# Patient Record
Sex: MALE | Race: WHITE | NOT HISPANIC OR LATINO | Employment: FULL TIME | URBAN - METROPOLITAN AREA
[De-identification: names, ages, dates, MRNs, and addresses within clinical notes are randomized per-mention and may not be internally consistent; named-entity substitution may affect disease eponyms.]

---

## 2022-02-18 ENCOUNTER — OFFICE VISIT (OUTPATIENT)
Dept: FAMILY MEDICINE | Facility: CLINIC | Age: 37
End: 2022-02-18
Payer: COMMERCIAL

## 2022-02-18 VITALS
WEIGHT: 315 LBS | BODY MASS INDEX: 37.19 KG/M2 | RESPIRATION RATE: 16 BRPM | HEIGHT: 77 IN | HEART RATE: 80 BPM | TEMPERATURE: 97.5 F | DIASTOLIC BLOOD PRESSURE: 74 MMHG | SYSTOLIC BLOOD PRESSURE: 136 MMHG

## 2022-02-18 DIAGNOSIS — Z11.4 SCREENING FOR HIV (HUMAN IMMUNODEFICIENCY VIRUS): ICD-10-CM

## 2022-02-18 DIAGNOSIS — Z11.59 ENCOUNTER FOR HEPATITIS C SCREENING TEST FOR LOW RISK PATIENT: ICD-10-CM

## 2022-02-18 DIAGNOSIS — Z00.00 ENCOUNTER FOR ROUTINE ADULT HEALTH EXAMINATION WITHOUT ABNORMAL FINDINGS: Primary | ICD-10-CM

## 2022-02-18 DIAGNOSIS — Z13.1 SCREENING FOR DIABETES MELLITUS: ICD-10-CM

## 2022-02-18 DIAGNOSIS — E88.810 METABOLIC SYNDROME X: ICD-10-CM

## 2022-02-18 DIAGNOSIS — I10 ESSENTIAL HYPERTENSION: ICD-10-CM

## 2022-02-18 DIAGNOSIS — K42.9 UMBILICAL HERNIA WITHOUT OBSTRUCTION AND WITHOUT GANGRENE: ICD-10-CM

## 2022-02-18 DIAGNOSIS — Z13.220 SCREENING FOR LIPID DISORDERS: ICD-10-CM

## 2022-02-18 DIAGNOSIS — E66.812 CLASS 2 SEVERE OBESITY DUE TO EXCESS CALORIES WITH SERIOUS COMORBIDITY AND BODY MASS INDEX (BMI) OF 39.0 TO 39.9 IN ADULT (H): ICD-10-CM

## 2022-02-18 DIAGNOSIS — E66.01 CLASS 2 SEVERE OBESITY DUE TO EXCESS CALORIES WITH SERIOUS COMORBIDITY AND BODY MASS INDEX (BMI) OF 39.0 TO 39.9 IN ADULT (H): ICD-10-CM

## 2022-02-18 DIAGNOSIS — R06.83 SNORES: ICD-10-CM

## 2022-02-18 PROBLEM — K62.89 MASS OF PERIRECTAL SOFT TISSUE: Status: ACTIVE | Noted: 2018-10-29

## 2022-02-18 PROBLEM — K62.89 MASS OF PERIRECTAL SOFT TISSUE: Status: RESOLVED | Noted: 2018-10-29 | Resolved: 2022-02-18

## 2022-02-18 LAB
ALT SERPL W P-5'-P-CCNC: 22 U/L (ref 0–45)
ANION GAP SERPL CALCULATED.3IONS-SCNC: 9 MMOL/L (ref 5–18)
BUN SERPL-MCNC: 21 MG/DL (ref 8–22)
CALCIUM SERPL-MCNC: 9.3 MG/DL (ref 8.5–10.5)
CHLORIDE BLD-SCNC: 107 MMOL/L (ref 98–107)
CHOLEST SERPL-MCNC: 220 MG/DL
CO2 SERPL-SCNC: 21 MMOL/L (ref 22–31)
CREAT SERPL-MCNC: 0.79 MG/DL (ref 0.7–1.3)
FASTING STATUS PATIENT QL REPORTED: YES
GFR SERPL CREATININE-BSD FRML MDRD: >90 ML/MIN/1.73M2
GLUCOSE BLD-MCNC: 91 MG/DL (ref 70–125)
HBA1C MFR BLD: 5 %
HDLC SERPL-MCNC: 50 MG/DL
HIV 1+2 AB+HIV1 P24 AG SERPL QL IA: NEGATIVE
LDLC SERPL CALC-MCNC: 135 MG/DL
POTASSIUM BLD-SCNC: 4.5 MMOL/L (ref 3.5–5)
SODIUM SERPL-SCNC: 137 MMOL/L (ref 136–145)
TRIGL SERPL-MCNC: 176 MG/DL

## 2022-02-18 PROCEDURE — 36415 COLL VENOUS BLD VENIPUNCTURE: CPT | Performed by: FAMILY MEDICINE

## 2022-02-18 PROCEDURE — 86803 HEPATITIS C AB TEST: CPT | Performed by: FAMILY MEDICINE

## 2022-02-18 PROCEDURE — 87389 HIV-1 AG W/HIV-1&-2 AB AG IA: CPT | Performed by: FAMILY MEDICINE

## 2022-02-18 PROCEDURE — 99214 OFFICE O/P EST MOD 30 MIN: CPT | Mod: 25 | Performed by: FAMILY MEDICINE

## 2022-02-18 PROCEDURE — 99385 PREV VISIT NEW AGE 18-39: CPT | Performed by: FAMILY MEDICINE

## 2022-02-18 PROCEDURE — 80061 LIPID PANEL: CPT | Performed by: FAMILY MEDICINE

## 2022-02-18 PROCEDURE — 80048 BASIC METABOLIC PNL TOTAL CA: CPT | Performed by: FAMILY MEDICINE

## 2022-02-18 PROCEDURE — 84460 ALANINE AMINO (ALT) (SGPT): CPT | Performed by: FAMILY MEDICINE

## 2022-02-18 PROCEDURE — 83036 HEMOGLOBIN GLYCOSYLATED A1C: CPT | Performed by: FAMILY MEDICINE

## 2022-02-18 RX ORDER — HYDROCHLOROTHIAZIDE 25 MG/1
25 TABLET ORAL DAILY
Qty: 30 TABLET | Refills: 1 | Status: SHIPPED | OUTPATIENT
Start: 2022-02-18 | End: 2022-03-24

## 2022-02-18 RX ORDER — FAMOTIDINE 20 MG
2000 TABLET ORAL DAILY
COMMUNITY

## 2022-02-18 RX ORDER — BENAZEPRIL HYDROCHLORIDE 20 MG/1
20 TABLET ORAL DAILY
Qty: 30 TABLET | Refills: 1 | Status: SHIPPED | OUTPATIENT
Start: 2022-02-18 | End: 2022-03-24

## 2022-02-18 RX ORDER — AMLODIPINE AND BENAZEPRIL HYDROCHLORIDE 10; 20 MG/1; MG/1
1 CAPSULE ORAL DAILY
COMMUNITY
Start: 2021-03-04 | End: 2022-02-18

## 2022-02-18 RX ORDER — CHLORAL HYDRATE 500 MG
2 CAPSULE ORAL DAILY
COMMUNITY

## 2022-02-18 ASSESSMENT — ENCOUNTER SYMPTOMS
DYSURIA: 0
WEAKNESS: 0
HEMATOCHEZIA: 0
COUGH: 0
PALPITATIONS: 0
CHILLS: 0
DIARRHEA: 0
PARESTHESIAS: 0
HEMATURIA: 0
CONSTIPATION: 0
HEADACHES: 0
SORE THROAT: 0
NAUSEA: 0
NERVOUS/ANXIOUS: 0
SHORTNESS OF BREATH: 0
EYE PAIN: 0
HEARTBURN: 0
DIZZINESS: 0
ARTHRALGIAS: 0
MYALGIAS: 0
JOINT SWELLING: 0
FREQUENCY: 0
FEVER: 0
ABDOMINAL PAIN: 0

## 2022-02-18 NOTE — PATIENT INSTRUCTIONS
Dr. Lokesh Watts MD   - The Obesity Code   - YouTube    Dr. Daniele Lomax MD   - Always [place]    Raoul Álvarez:   - Why we get sick    Dr. Dorothea Zamudio, DO.   Search for her name in Youtube with added criteria: Calli Rose, PhD   - Why We Sleep   - search for author name and podcast for a number of interviews  _______________________________    Interested articles on nutrition:    Saturated fat: Saturated Fats and Health: A Reassessment and Proposal for Food-Based Recommendations: JACC State-of-the-Art Review June (https://www.jacc.org/doi/full/10.1016/j.jacc.2020.05.077)    A Pesco-Mediterranean Diet With Intermittent Fasting: JACC Review Topic of the Week (https://www.jacc.org/doi/full/10.1016/j.jacc.2020.07.049)

## 2022-02-18 NOTE — ASSESSMENT & PLAN NOTE
Annual exam.  Weight gain.  Excessive hungry nests.  He loosely meets criteria for metabolic syndrome.  Fasting lab work will be completed.  Discussed nutritional restriction of carbohydrates with a focus on protein.  He has been successful with losing weight through healthy eating increase physical activity.  Declines COVID-19 vaccination.

## 2022-02-18 NOTE — ASSESSMENT & PLAN NOTE
Lower extremity swelling as a side effect.  He has not had a work-up for secondary causes.  He is high risk for obstructive sleep apnea.  -Check basic metabolic panel.  -Stop amlodipine.  Start hydrochlorothiazide.  Continue benazepril.  -Return to clinic in 2 weeks for blood serum electrolyte measurement and BP check.  -Consider sleep medicine referral.

## 2022-02-18 NOTE — PROGRESS NOTES
SUBJECTIVE:   CC: Hammad Estrada is an 36 year old male who presents for preventative health visit.     Chief Complaint   Patient presents with     Physical     Hypertension     Patient has been advised of split billing requirements and indicates understanding: Yes     Swelling in lower extremities   - works as :   - worse when on feet all day.  Red and burns at the end of shift.  Overnight redness.     - snacks throughout the day   - hungry a lot   - overeats at night.   - some hangrinness    Healthy Habits:     Getting at least 3 servings of Calcium per day:  Yes    Bi-annual eye exam:  NO    Dental care twice a year:  Yes    Sleep apnea or symptoms of sleep apnea:  Excessive snoring and Sleep apnea    Diet:  Low salt and Gluten-free/reduced    Frequency of exercise:  4-5 days/week    Duration of exercise:  45-60 minutes    Taking medications regularly:  Yes    Medication side effects:  Other    PHQ-2 Total Score: 0    Additional concerns today:  No    Today's PHQ-2 Score:   PHQ-2 (  [employer]) 2022   Q1: Little interest or pleasure in doing things 0   Q2: Feeling down, depressed or hopeless 0   PHQ-2 Score 0   Q1: Little interest or pleasure in doing things Not at all   Q2: Feeling down, depressed or hopeless Not at all   PHQ-2 Score 0       Abuse: Current or Past(Physical, Sexual or Emotional)- No  Do you feel safe in your environment? Yes        Social History     Tobacco Use     Smoking status: Former Smoker     Packs/day: 1.00     Start date: 2004     Quit date: 2021     Years since quittin.1     Smokeless tobacco: Never Used     Tobacco comment: Vape pen tobacco free   Substance Use Topics     Alcohol use: Yes     Alcohol/week: 3.0 standard drinks     Types: 3 Glasses of wine per week     Comment: social.  Weekly.          Alcohol Use 2022   Prescreen: >3 drinks/day or >7 drinks/week? No       Last PSA: No results found for: PSA    Reviewed orders with patient. Reviewed health  "maintenance and updated orders accordingly - Yes    Reviewed and updated as needed this visit by clinical staff   Tobacco  Allergies  Meds  Problems  Med Hx   Fam Hx  Soc Hx        Reviewed and updated as needed this visit by Provider    Allergies   Problems  Med Hx    Soc Hx         Review of Systems   Constitutional: Negative for chills and fever.   HENT: Negative for congestion, ear pain, hearing loss and sore throat.    Eyes: Negative for pain and visual disturbance.   Respiratory: Negative for cough and shortness of breath.    Cardiovascular: Negative for chest pain, palpitations and peripheral edema.   Gastrointestinal: Negative for abdominal pain, constipation, diarrhea, heartburn, hematochezia and nausea.   Genitourinary: Negative for dysuria, frequency, genital sores, hematuria, impotence, penile discharge and urgency.   Musculoskeletal: Negative for arthralgias, joint swelling and myalgias.   Skin: Negative for rash.   Neurological: Negative for dizziness, weakness, headaches and paresthesias.   Psychiatric/Behavioral: Negative for mood changes. The patient is not nervous/anxious.        OBJECTIVE:   /74 (BP Location: Left arm, Patient Position: Sitting, Cuff Size: Adult Large)   Pulse 80   Temp 97.5  F (36.4  C) (Oral)   Resp 16   Ht 1.943 m (6' 4.5\")   Wt 148 kg (326 lb 3 oz)   BMI 39.19 kg/m      Physical Exam  Vitals reviewed.   Constitutional:       General: He is not in acute distress.     Appearance: Normal appearance.   HENT:      Head: Normocephalic and atraumatic.      Right Ear: External ear normal.      Left Ear: External ear normal.      Nose: Nose normal.      Mouth/Throat:      Pharynx: No oropharyngeal exudate or posterior oropharyngeal erythema.   Eyes:      General: No scleral icterus.  Cardiovascular:      Rate and Rhythm: Normal rate and regular rhythm.      Heart sounds: Normal heart sounds. No murmur heard.    No friction rub. No gallop.   Pulmonary:      Effort: " Pulmonary effort is normal. No respiratory distress.      Breath sounds: No wheezing.   Abdominal:      General: Bowel sounds are normal. There is no distension.      Palpations: Abdomen is soft. There is no mass.      Tenderness: There is no abdominal tenderness.   Musculoskeletal:         General: No swelling. Normal range of motion.      Cervical back: Normal range of motion.   Skin:     Findings: No rash.      Comments: Skin tags.   Neurological:      General: No focal deficit present.      Mental Status: He is alert and oriented to person, place, and time.      Cranial Nerves: No cranial nerve deficit.      Deep Tendon Reflexes: Reflexes normal.   Psychiatric:         Mood and Affect: Mood normal.         Behavior: Behavior normal.         Thought Content: Thought content normal.         Judgment: Judgment normal.       Diagnostic Test Results:  Labs reviewed in Epic    ASSESSMENT/PLAN:     Essential hypertension  Lower extremity swelling as a side effect.  He has not had a work-up for secondary causes.  He is high risk for obstructive sleep apnea.  -Check basic metabolic panel.  -Stop amlodipine.  Start hydrochlorothiazide.  Continue benazepril.  -Return to clinic in 2 weeks for blood serum electrolyte measurement and BP check.  -Consider sleep medicine referral.    Encounter for routine adult health examination without abnormal findings  Annual exam.  Weight gain.  Excessive hungry nests.  He loosely meets criteria for metabolic syndrome.  Fasting lab work will be completed.  Discussed nutritional restriction of carbohydrates with a focus on protein.  He has been successful with losing weight through healthy eating increase physical activity.  Declines COVID-19 vaccination.    Umbilical hernia without obstruction and without gangrene  Occasionally uncomfortable.  At this time, the patient does not want an intervention.    Patient has been advised of split billing requirements and indicates understanding:  "Yes    COUNSELING:   Reviewed preventive health counseling, as reflected in patient instructions       Regular exercise       Healthy diet/nutrition       Consider Hep C screening for all patients one time for ages 18-79 years       HIV screeninx in teen years, 1x in adult years, and at intervals if high risk       Colorectal cancer screening       Prostate cancer screening    Estimated body mass index is 39.19 kg/m  as calculated from the following:    Height as of this encounter: 1.943 m (6' 4.5\").    Weight as of this encounter: 148 kg (326 lb 3 oz).     Weight management plan: Discussed healthy diet and exercise guidelines    He reports that he quit smoking about 13 months ago. He started smoking about 18 years ago. He smoked 1.00 pack per day. He has never used smokeless tobacco.      Counseling Resources:  ATP IV Guidelines  Pooled Cohorts Equation Calculator  FRAX Risk Assessment  ICSI Preventive Guidelines  Dietary Guidelines for Americans,   USDA's MyPlate  ASA Prophylaxis  Lung CA Screening    Johnie Mobley MD  [facility]  "

## 2022-02-21 LAB — HCV AB SERPL QL IA: NEGATIVE

## 2022-03-04 ENCOUNTER — ALLIED HEALTH/NURSE VISIT (OUTPATIENT)
Dept: FAMILY MEDICINE | Facility: CLINIC | Age: 37
End: 2022-03-04
Payer: COMMERCIAL

## 2022-03-04 ENCOUNTER — LAB (OUTPATIENT)
Dept: LAB | Facility: CLINIC | Age: 37
End: 2022-03-04

## 2022-03-04 VITALS — DIASTOLIC BLOOD PRESSURE: 67 MMHG | HEART RATE: 68 BPM | SYSTOLIC BLOOD PRESSURE: 122 MMHG

## 2022-03-04 DIAGNOSIS — Z11.4 SCREENING FOR HIV (HUMAN IMMUNODEFICIENCY VIRUS): Primary | ICD-10-CM

## 2022-03-04 DIAGNOSIS — I10 ESSENTIAL HYPERTENSION: Primary | ICD-10-CM

## 2022-03-04 DIAGNOSIS — I10 ESSENTIAL HYPERTENSION: ICD-10-CM

## 2022-03-04 LAB
ANION GAP SERPL CALCULATED.3IONS-SCNC: 9 MMOL/L (ref 5–18)
BUN SERPL-MCNC: 19 MG/DL (ref 8–22)
CALCIUM SERPL-MCNC: 9.5 MG/DL (ref 8.5–10.5)
CHLORIDE BLD-SCNC: 103 MMOL/L (ref 98–107)
CO2 SERPL-SCNC: 26 MMOL/L (ref 22–31)
CREAT SERPL-MCNC: 0.87 MG/DL (ref 0.7–1.3)
GFR SERPL CREATININE-BSD FRML MDRD: >90 ML/MIN/1.73M2
GLUCOSE BLD-MCNC: 106 MG/DL (ref 70–125)
POTASSIUM BLD-SCNC: 4 MMOL/L (ref 3.5–5)
SODIUM SERPL-SCNC: 138 MMOL/L (ref 136–145)

## 2022-03-04 PROCEDURE — 36415 COLL VENOUS BLD VENIPUNCTURE: CPT

## 2022-03-04 PROCEDURE — 99207 PR NO CHARGE NURSE ONLY: CPT

## 2022-03-04 PROCEDURE — 80048 BASIC METABOLIC PNL TOTAL CA: CPT

## 2022-03-04 NOTE — PROGRESS NOTES
I met with Hammad Estrada at the request of Dr. Alves to recheck his blood pressure.  Blood pressure medications on the med list were reviewed with patient.    Patient has taken all medications as per usual regimen: No  Patient reports tolerating them without any issues or concerns: No    Vitals:    03/04/22 0833   BP: 122/67   BP Location: Left arm   Patient Position: Sitting   Cuff Size: Adult Large   Pulse: 68       Blood pressure was taken, previous encounter was reviewed, recorded blood pressure below 140/90.  Patient was discharged and the note will be sent to the provider for final review.

## 2022-03-15 DIAGNOSIS — I10 ESSENTIAL HYPERTENSION: ICD-10-CM

## 2022-03-15 RX ORDER — BENAZEPRIL HYDROCHLORIDE 20 MG/1
TABLET ORAL
Qty: 30 TABLET | Refills: 1 | OUTPATIENT
Start: 2022-03-15

## 2022-03-23 ENCOUNTER — MYC MEDICAL ADVICE (OUTPATIENT)
Dept: FAMILY MEDICINE | Facility: CLINIC | Age: 37
End: 2022-03-23
Payer: COMMERCIAL

## 2022-03-23 DIAGNOSIS — I10 ESSENTIAL HYPERTENSION: ICD-10-CM

## 2022-03-24 RX ORDER — BENAZEPRIL HYDROCHLORIDE 20 MG/1
20 TABLET ORAL DAILY
Qty: 90 TABLET | Refills: 1 | Status: SHIPPED | OUTPATIENT
Start: 2022-03-24 | End: 2022-10-12

## 2022-03-24 RX ORDER — HYDROCHLOROTHIAZIDE 25 MG/1
25 TABLET ORAL DAILY
Qty: 90 TABLET | Refills: 1 | Status: SHIPPED | OUTPATIENT
Start: 2022-03-24 | End: 2022-10-12

## 2022-04-03 ENCOUNTER — HEALTH MAINTENANCE LETTER (OUTPATIENT)
Age: 37
End: 2022-04-03

## 2022-04-14 DIAGNOSIS — I10 ESSENTIAL HYPERTENSION: ICD-10-CM

## 2022-04-17 RX ORDER — BENAZEPRIL HYDROCHLORIDE 20 MG/1
TABLET ORAL
Qty: 30 TABLET | Refills: 1 | OUTPATIENT
Start: 2022-04-17

## 2022-05-01 ENCOUNTER — MYC MEDICAL ADVICE (OUTPATIENT)
Dept: FAMILY MEDICINE | Facility: CLINIC | Age: 37
End: 2022-05-01
Payer: COMMERCIAL

## 2022-05-01 DIAGNOSIS — R06.83 SNORES: Primary | ICD-10-CM

## 2022-05-01 PROCEDURE — 99207 E-CONSULT TO SLEEP MEDICINE (ADULT OUTPT PROVIDER TO SPECIALIST WRITTEN QUESTION & RESPONSE): CPT | Performed by: FAMILY MEDICINE

## 2022-05-03 ENCOUNTER — E-CONSULT (OUTPATIENT)
Dept: SLEEP MEDICINE | Facility: CLINIC | Age: 37
End: 2022-05-03
Payer: COMMERCIAL

## 2022-05-03 DIAGNOSIS — R06.81 APNEA: ICD-10-CM

## 2022-05-03 DIAGNOSIS — I10 ESSENTIAL HYPERTENSION: Primary | ICD-10-CM

## 2022-05-03 DIAGNOSIS — R06.83 SNORING: ICD-10-CM

## 2022-05-03 PROCEDURE — 99451 NTRPROF PH1/NTRNET/EHR 5/>: CPT | Performed by: FAMILY MEDICINE

## 2022-05-03 NOTE — TELEPHONE ENCOUNTER
Stop bang score at least 6/8.  BMI 39.  Hypertension.  High neck circumference.  Snores with observed pauses.  E consult sent to sleep medicine for review.  Consider formal traditional referral if requested.

## 2022-05-03 NOTE — PROGRESS NOTES
ALL SMARTFIELDS MUST BE COMPLETED FOR PATIENT CARE AND BILLING    5/3/2022     E-Consult has been accepted.    Interprofessional consultation requested by:  Johnie Mobley MD      Clinical Question/Purpose: MY CLINICAL QUESTION IS: elevated STOP BANG (6-7/8).  BMI:39.  No previous PSG.     Patient assessment and information reviewed: Past medical history (HTN, obesity).  Snoring, observed apnea.    Recommendations:     High risk for VIRGILIO with STOPBANG score of 6-7, BMI ~39.  Appears to be candidate for home sleep testing or in-lab PSG.    Will place orders for WatchPAT home sleep testing to be sent via mail.      The recommendations provided in this E-Consult are based on a review of clinical data pertinent to the clinical question presented, without a review of the patient's complete medical record or, the benefit of a comprehensive in-person or virtual patient evaluation. This consultation should not replace the clinical judgement and evaluation of the provider ordering this E-Consult. Any new clinical issues, or changes in patient status since the filing of this E-Consult will need to be taken into account when assessing these recommendations. Please contact me if you have further questions.    My total time spent reviewing clinical information and formulating assessment was 5 minutes.    Report sent automatically to requesting provider once signed.     Kevin Mehta MD, MD

## 2022-06-06 ENCOUNTER — VIRTUAL VISIT (OUTPATIENT)
Dept: SLEEP MEDICINE | Facility: CLINIC | Age: 37
End: 2022-06-06
Attending: FAMILY MEDICINE
Payer: COMMERCIAL

## 2022-06-06 DIAGNOSIS — R06.83 SNORING: ICD-10-CM

## 2022-06-06 DIAGNOSIS — I10 ESSENTIAL HYPERTENSION: ICD-10-CM

## 2022-06-06 DIAGNOSIS — R06.81 APNEA: ICD-10-CM

## 2022-06-06 NOTE — PROGRESS NOTES
Device has been registered and shipped via [employer] on 6/6/2022. Patient was notified that package was mailed out.

## 2022-06-07 PROCEDURE — G0399 HOME SLEEP TEST/TYPE 3 PORTA: HCPCS | Performed by: INTERNAL MEDICINE

## 2022-06-08 ENCOUNTER — TELEPHONE (OUTPATIENT)
Dept: SLEEP MEDICINE | Facility: CLINIC | Age: 37
End: 2022-06-08
Payer: COMMERCIAL

## 2022-06-08 NOTE — TELEPHONE ENCOUNTER
Please call the patient to schedule for an earlier follow-up visit to review the results of the sleep study.  Please schedule patient to follow-up with me next week. May book on my lunch hour except for Wednesdays and Fridays.Thank you.

## 2022-06-08 NOTE — PROCEDURES
"WatchPAT - HOME SLEEP STUDY INTERPRETATION    Patient: Hammad Estrada  MRN: [id number]  YOB: 1985  Study Date: 06/07/22  Referring Provider: Jennifer Ref-Primary, Physician;   Ordering Provider: Prateek Rodriguez DO    Chain of custody patient verification was not enabled.  Chain of custody verification was not present throughout the entire study.     Indications for Home Study: Hammad Estrada is a 36 year old male who presents with symptoms suggestive of obstructive sleep apnea.    Estimated body mass index is 39.19 kg/m  as calculated from the following:    Height as of 2/18/22: 1.943 m (6' 4.5\").    Weight as of 2/18/22: 148 kg (326 lb 3 oz).      Data: A full night home sleep study was performed recording the standard physiologic parameters including peripheral arterial tonometry (PAT), sound/snoring, body position,  movement, sound, and oxygen saturation by pulse oximetry. Pulse rate was estimated by oximetry recording. Sleep staging (wake, REM, light, and deep sleep) was derived from PAT signal.  This study was considered adequate based on > 4 hours of quality oximetry and respiratory recording. As specified by the AASM Manual for the Scoring of Sleep and Associated events, version 2.3, Rule VIII.D 1B, 4% oxygen desaturation scoring for hypopneas is used as a standard of care on all home sleep apnea testing.    Total Recording Time: 6 hrs, 13 min  Total Sleep Time: 5 hrs, 31 min  % of Sleep Time REM: 32.5%    Respiratory:  Snoring: Snoring was present.  Respiratory events: The PAT respiratory disturbance index [pRDI] was 39.2 events per hour.  The PAT apnea/hypopnea index [pAHI] was 38.4 events per hour.  SAHIL was 34.6 events per hour.  During REM sleep the pAHI was 46.6.  Sleep Associated Hypoxemia: sustained hypoxemia was present. Mean oxygen saturation was 94%.  Minimum was 73%.  Time with saturation less than 88% was 9.4 minutes.    Heart Rate: By pulse oximetry normal rate was noted. "     Position: Percent of time spent: supine - 72.5%, prone - 21.7%, on right - 0.3%, on left - 5.4%.  pAHI was 45.9 per hour supine, 8.1 per hour prone, N/A per hour on right side, and 53.9 per hour on left side.     Assessment:   Severe obstructive sleep apnea.  Sleep associated hypoxemia was present.    Recommendations:  Consider auto-CPAP at 5-20 cmH2O or polysomnography with full night PAP titration.  Suggest optimizing sleep hygiene and avoiding sleep deprivation.  Weight management.    Diagnosis Code(s): Obstructive Sleep Apnea G47.33, Hypoxemia G47.36    Prateek Rodriguez DO, June 8, 2022   Diplomate, American Board of Internal Medicine, Sleep Medicine

## 2022-06-08 NOTE — PROGRESS NOTES
Watch Pat has been scored using rule 1B, 4%.  Patient to follow up with provider to determine appropriate therapy.    PAT AHI: 38.4    Ordering Provider: Kevin Mehta MD

## 2022-06-20 ENCOUNTER — VIRTUAL VISIT (OUTPATIENT)
Dept: SLEEP MEDICINE | Facility: CLINIC | Age: 37
End: 2022-06-20
Payer: COMMERCIAL

## 2022-06-20 VITALS — WEIGHT: 315 LBS | HEIGHT: 78 IN | BODY MASS INDEX: 36.45 KG/M2

## 2022-06-20 DIAGNOSIS — E66.9 OBESITY (BMI 30-39.9): ICD-10-CM

## 2022-06-20 DIAGNOSIS — G47.36 HYPOXEMIA ASSOCIATED WITH SLEEP: ICD-10-CM

## 2022-06-20 DIAGNOSIS — G47.33 OSA (OBSTRUCTIVE SLEEP APNEA): Primary | ICD-10-CM

## 2022-06-20 PROCEDURE — 99213 OFFICE O/P EST LOW 20 MIN: CPT | Mod: 95 | Performed by: NURSE PRACTITIONER

## 2022-06-20 ASSESSMENT — SLEEP AND FATIGUE QUESTIONNAIRES
HOW LIKELY ARE YOU TO NOD OFF OR FALL ASLEEP WHILE SITTING INACTIVE IN A PUBLIC PLACE: SLIGHT CHANCE OF DOZING
HOW LIKELY ARE YOU TO NOD OFF OR FALL ASLEEP WHILE SITTING QUIETLY AFTER LUNCH WITHOUT ALCOHOL: SLIGHT CHANCE OF DOZING
HOW LIKELY ARE YOU TO NOD OFF OR FALL ASLEEP WHILE SITTING AND TALKING TO SOMEONE: WOULD NEVER DOZE
HOW LIKELY ARE YOU TO NOD OFF OR FALL ASLEEP WHEN YOU ARE A PASSENGER IN A CAR FOR AN HOUR WITHOUT A BREAK: WOULD NEVER DOZE
HOW LIKELY ARE YOU TO NOD OFF OR FALL ASLEEP WHILE WATCHING TV: SLIGHT CHANCE OF DOZING
HOW LIKELY ARE YOU TO NOD OFF OR FALL ASLEEP WHILE SITTING AND READING: MODERATE CHANCE OF DOZING
HOW LIKELY ARE YOU TO NOD OFF OR FALL ASLEEP WHILE LYING DOWN TO REST IN THE AFTERNOON WHEN CIRCUMSTANCES PERMIT: HIGH CHANCE OF DOZING
HOW LIKELY ARE YOU TO NOD OFF OR FALL ASLEEP IN A CAR, WHILE STOPPED FOR A FEW MINUTES IN TRAFFIC: WOULD NEVER DOZE

## 2022-06-20 NOTE — PROGRESS NOTES
"Hammad is a 36 year old who is being evaluated via a billable video visit.      How would you like to obtain your AVS? MyChart  If the video visit is dropped, the invitation should be resent by: Send to e-mail at: [email or web address]  Will anyone else be joining your video visit? No  {If patient encounters technical issues they should call [phone] :[phone]}Stacie Bardales        Video-Visit Details    Video Start Time: {video visit start/end time for provider to select:152948}    Type of service:  Video Visit    Video End Time:{video visit start/end time for provider to select:152948}    Originating Location (pt. Location): {video visit patient location:665407::\"Home\"}    Distant Location (provider location):  [facility]     Platform used for Video Visit: {Virtual Visit Platforms:103428::\"Lemko\"}  "

## 2022-06-20 NOTE — PATIENT INSTRUCTIONS
"MY INFORMATION ON SLEEP APNEA-  Hammad Estrada    DOCTOR : TREY Jules CNP  SLEEP CENTER :      MY CONTACT NUMBER:   [facility]  [phone]  [facility]   [phone]  [facility]   [phone]      [facility]  [phone]  [facility]   [phone]    [facility] [place] [address], [address]  [place] [address]  Phone: [phone]    Hours:  Mon - Fri: 8:00 a.m. - 4:30 p.m.  Sat: Closed  Sun: Closed      Key Points:  1. What is Obstructive Sleep Apnea (VIRGILIO)? VIRGILIO is the most common type of sleep apnea. Apnea literally means, \"without breath.\" It is characterized by repetitive pauses in breathing, despite continued effort to breathe, and is usually associated with a reduction in blood oxygen saturation. Apneas can last 10 to over 60 seconds. It is caused by narrowing or collapse of the upper airway as muscles relax during sleep.   2. What are the consequences of VIRGILIO? Symptoms include: daytime sleepiness- possibly increasing the risk of falling asleep while driving, unrefreshing/restless sleep, snoring, insomnia, waking frequently to urinate, waking with heartburn or reflux, reduced concentration and memory, and morning headaches. Other health consequences may include development of high blood pressure. Untreated VIRGILIO also can contribute to heart disease, stroke and diabetes.   3. What are the treatment options? In most situations, sleep apnea is a lifelong disease that must be managed with daily therapy. Continuous Positive Airway (CPAP) is the most reliable treatment. A mouthguard to hold your jaw forward is usually the next most reliable option. Other options include postioning devices (to keep you off your back), nasal valves, tongue retaining device, weight loss, surgery. There is more detail about these options toward the end of this " document.  4. What are the most important things to remember about using CPAP?     WHERE CAN I FIND MORE INFORMATION?    American Academy of Sleep Medicine Patient information on sleep disorders:  http://yoursleep.aasmnet.org    CPAP -  WHY AND HOW?                 Continuous positive airway pressure, or CPAP, is the most effective treatment for obstructive sleep apnea. It works by blowing room air, through a mask, to hold your throat open. A decision to use CPAP is a major step forward in the pursuit of a healthier life. The successful use of CPAP will help you breathe easier, sleep better and live healthier. Using CPAP can be a positive experience if you keep these penn points in mind:  Commitment  CPAP is not a quick fix for your problem. It involves a long-term commitment to improve your sleep and your health.    Communication  Stay in close communication with both your sleep doctor and your CPAP supplier. Ask lots of questions and seek help when you need it.    Consistency  Use CPAP all night, every night and for every nap. You will receive the maximum health benefits from CPAP when you use it every time that you sleep. This will also make it easier for your body to adjust to the treatment.    Correction  The first machine and mask that you try may not be the best ones for you. Work with your sleep doctor and your CPAP supplier to make corrections to your equipment selection. Ask about trying a different type of machine or mask if you have ongoing problems. Make sure that your mask is a good fit and learn to use your equipment properly.    Challenge  Tell a family member or close friend to ask you each morning if you used your CPAP the previous night. Have someone to challenge you to give it your best effort.    Connection   Your adjustment to CPAP will be easier if you are able to connect with others who use the same treatment. Ask your sleep doctor if there is a support group in your area for people who have  "sleep apnea, or look for one on the Internet.  Comfort   Increase your level of comfort by using a saline spray, decongestant or heated humidifier if CPAP irritates your nose, mouth or throat. Use your unit's \"ramp\" setting to slowly get used to the air pressure level. There may be soft pads you can buy that will fit over your mask straps. Look on www.CPAP.com for accessories that can help make CPAP use more comfortable.  Cleaning   Clean your mask, tubing and headgear on a regular basis. Put this time in your schedule so that you don't forget to do it. Check and replace the filters for your CPAP unit and humidifier.    Completion   Although you are never finished with CPAP therapy, you should reward yourself by celebrating the completion of your first month of treatment. Expect this first month to be your hardest period of adjustment. It will involve some trial and error as you find the machine, mask and pressure settings that are right for you.    Continuation  After your first month of treatment, continue to make a daily commitment to use your CPAP all night, every night and for every nap.    CPAP-Tips to starting with success:  Begin using your CPAP for short periods of time during the day while you watch TV or read.    Use CPAP every night and for every nap. Using it less often reduces the health benefits and makes it harder for your body to get used to it.    Newer CPAP models are virtually silent; however, if you find the sound of your CPAP machine to be bothersome, place the unit under your bed to dampen the sound.     Make small adjustments to your mask, tubing, straps and headgear until you get the right fit. Tightening the mask may actually worsen the leak.  If it leaves significant marks on your face or irritates the bridge of your nose, it may not be the best mask for you.  Speak with the person who supplied the mask and consider trying other masks. Insurances will allow you to try different masks " during the first month of starting CPAP.  Insurance also covers a new mask, hose and filter about every 6 months.    Use a saline nasal spray to ease mild nasal congestion. Neti-Pot or saline nasal rinses may also help. Nasal gel sprays can help reduce nasal dryness.  Biotene mouthwash can be helpful to protect your teeth if you experience frequent dry mouth.  Dry mouth may be a sign of air escaping out of your mouth or out of the mask in the case of a full face mask.  Speak with your provider if you expect that is the case.     Take a nasal decongestant to relieve more severe nasal or sinus congestion.  Do not use Afrin (oxymetazoline) nasal spray more than 3 days in a row.  Speak with your sleep doctor if your nasal congestion is chronic.    Use a heated humidifier that fits your CPAP model to enhance your breathing comfort. Adjust the heat setting up if you get a dry nose or throat, down if you get condensation in the hose or mask.  Position the CPAP lower than you so that any condensation in the hose drains back into the machine rather than towards the mask.    Try a system that uses nasal pillows if traditional masks give you problems.    Clean your mask, tubing and headgear once a week. Make sure the equipment dries fully.    Regularly check and replace the filters for your CPAP unit and humidifier.    Work closely with your sleep provider and your CPAP supplier to make sure that you have the machine, mask and air pressure setting that works best for you. It is better to stop using it and call your provider to solve problems than to lay awake all night frustrated with the device.  Weight Loss:    Weight loss decreases severity of sleep apnea in most people with obesity. For those with mild obesity who have developed snoring with weight gain, even 15-30 pound weight loss can improve and occasionally eliminate sleep apnea.  Structured and life-long dietary and health habits are necessary to lose weight and keep  healthier weight levels.     Though there are significant health benefits from weight loss, long-term weight loss is very difficult to achieve- studies show success with dietary management in less than 10% of people. In addition, substantial weight loss may require years of dietary control and may be difficult if patients have severe obesity. In these cases, surgical management may be considered.    If you are interested in methods for weight loss, you should review the options discussed at the National Institutes of Health patient information sites:     http://win.niddk.nih.gov/publications/index.htm  http:/www.health.nih.gov/topic/WeightLossDieting    Bariatric programs offer counseling in all methods of weight loss:    Http:/www.[facility].org/Specialties/WeightLossSurgeryandMedicalMgmt/htm    Your BMI is Body mass index is 36.87 kg/m .    Weight management plan: Patient was referred to their PCP to discuss a diet and exercise plan.    Body mass index (BMI) is one way to tell whether you are at a healthy weight, overweight, or obese. It measures your weight in relation to your height.  A BMI of 18.5 to 24.9 is in the healthy range. A person with a BMI of 25 to 29.9 is considered overweight, and someone with a BMI of 30 or greater is considered obese.  Another way to find out if you are at risk for health problems caused by overweight and obesity is to measure your waist. If you are a woman and your waist is more than 35 inches, or if you are a man and your waist is more than 40 inches, your risk of disease may be higher.  More than two-thirds of American adults are considered overweight or obese. Being overweight or obese increases the risk for further weight gain.  Excess weight may lead to heart disease and diabetes. Creating and following plans for healthy eating and physical activity may help you improve your health.    Methods for maintaining or losing weight.    Weight control is part of healthy  lifestyle and includes exercise, emotional health, and healthy eating habits.  Careful eating habits lifelong is the mainstay of weight control.  Though there are significant health benefits from weight loss, long-term weight loss with diet alone may be very difficult to achieve- studies show long-term success with dietary management in less than 10% of people. Attaining a healthy weight may be especially difficult to achieve in those with severe obesity. In some cases, medications, devices and surgical management might be considered.    What can you do?    If you are overweight or obese and are interested in methods for weight loss, you should discuss this with your provider. In addition, we recommend that you review healthy life styles and methods for weight loss available through the National Institutes of Health patient information sites:     http://win.niddk.nih.gov/publications/index.htm

## 2022-06-20 NOTE — PROGRESS NOTES
"Hammad is a 36 year old who is being evaluated via a billable video visit.      How would you like to obtain your AVS? MyChart  If the video visit is dropped, the invitation should be resent by: Send to e-mail at: [email or web address]  Will anyone else be joining your video visit? Jennifer Bardales        Video-Visit Details    Video Start Time: 1:01 PM    Type of service:  Video Visit    Video End Time:  1:17 PM    Originating Location (pt. Location): Home    Distant Location (provider location):  [facility]     Platform used for Video Visit: aWhere      Home Sleep Apnea Testing Results Visit:    Chief Complaint   Patient presents with     sleep study follow up       Hammad Estrada is a 36 year old male who returns to [facility] after having had Home Sleep Apnea Testing.  He presented with symptoms suggestive of obstructive sleep apnea.  He underwent E-consult with Dr. Kevin Mehta on 5/3/2022 with symptoms of snoring and observed apneas in the setting of hypertension and obesity.  His STOP-BANG score was 6-7 and the patient subsequently underwent HST as noted below.  He presents today for follow-up of these results.    Estimated body mass index is 36.87 kg/m  as calculated from the following:    Height as of this encounter: 1.969 m (6' 5.5\").    Weight as of this encounter: 142.9 kg (315 lb).  Total score - [place]: 8 (6/20/2022 11:56 AM)   EVA Total Score: 11    [facility] Home Sleep Apnea Testing - 6/07/2022: 326 lbs 3 oz: pAHI 38.4/hr. Supine pAHI 45.9/hr.   pAHI was 45.9 per hour supine, 8.1 per hour prone, N/A per hour on right side, and 53.9 per hour on left side.   Oxygen Obed of 73%.  Baseline 94%.  Sp02 =< 88% for 9.4 minutes  He slept on his back (72.5%), prone (21.7%), left (5.4%) and right (0.3%) sides.   Total Recording Time: 6 hrs, 13 min  Total Sleep Time: 5 hrs, 31 min  % of Sleep Time REM: 32.5%      Hammad Estrada reports that he slept Fair . " "    Home Sleep Apnea Testing was reviewed in detail today with Hammad and a copy given to him for his records.    Past medical/surgical history, family history, social history, medications and allergies were reviewed.    Patient Active Problem List   Diagnosis     Essential hypertension     Encounter for routine adult health examination without abnormal findings     Umbilical hernia without obstruction and without gangrene     Metabolic syndrome X     Current Outpatient Medications   Medication     benazepril (LOTENSIN) 20 MG tablet     Collagen-Vitamin C-Biotin (COLLAGEN 1500/C PO)     fish oil-omega-3 fatty acids 1000 MG capsule     hydrochlorothiazide (HYDRODIURIL) 25 MG tablet     MULTIPLE VITAMIN PO     TURMERIC PO     Vitamin D, Cholecalciferol, 25 MCG (1000 UT) CAPS     No current facility-administered medications for this visit.     Ht 1.969 m (6' 5.5\")   Wt 142.9 kg (315 lb)   BMI 36.87 kg/m      Impression/Plan:  Severe Obstructive Sleep Apnea.   Sleep associated hypoxemia was present.  Obesity (BMI 30-39.9)  - Comprehensive DME    Treatment options discussed today including  auto-CPAP at 5-20 cmH2O or polysomnography with full night PAP titration.    Elected treatment with auto-CPAP at 5-20 cmH2O. A comprehensive DME order was placed for new APAP device, nasal pillow mask and supplies sent to [facility].  We discussed the usual use/compliance requirements associated with new PAP device therapy.  In addition, the patient was notified that there may be a delay in obtaining his new APAP device due to the ongoing nationwide CPAP supply shortage.  We also discussed the role the body weight plays with regard to obstructive sleep apnea and I have encouraged weight loss as the patient is able.    The patient was asked to follow-up in approximately 2 months after obtaining new APAP device to review download data and use/compliance.    25 minutes spent with patient with >50% spent in counseling, " chart review/documentation, and coordination of care on the date of the encounter.      TREY Jules CNP  Sleep Medicine      CC:  No Ref-Primary, Physician,     This note was written with the assistance of the Dragon voice-dictation technology software. The final document, although reviewed, may contain errors. For corrections, please contact the office.

## 2022-07-12 NOTE — NURSING NOTE
DME orders [facility]. My Chart message will be sent to patient:  Hammad Estrada  [address]   [address]  [place] [address]       2022      Dear Mr. Estrada,      Your provider has placed an order for you to get a new PAP device. Your medical equipment company will try to contact you as soon as possible to schedule your set up (Please be advised, due to a shortage of machines, this may take longer to receive call). Once you know the date of this appointment, please contact our office to schedule a follow up visit with your provider. This appointment should be scheduled 60 days from the day that you will be set up on your new device.     [facility] [employer] contact information:     [facility]: [phone]  [place]: [phone]  [place]: [phone]  [phone]  [place]: [phone]  [place]: [phone]        [facility]   Schedule line: [phone]      Sincerely,          Carole Bhatt, [facility]  Sleep Medicine

## 2022-08-09 ENCOUNTER — DOCUMENTATION ONLY (OUTPATIENT)
Dept: SLEEP MEDICINE | Facility: CLINIC | Age: 37
End: 2022-08-09

## 2022-08-09 DIAGNOSIS — G47.33 OSA (OBSTRUCTIVE SLEEP APNEA): Primary | ICD-10-CM

## 2022-09-20 NOTE — TELEPHONE ENCOUNTER
FUTURE VISIT INFORMATION      FUTURE VISIT INFORMATION:    Date: 10/25/22    Time: 11:20am    Location: [facility]  REFERRAL INFORMATION:    Referring provider:      Referring providers clinic:      Reason for visit/diagnosis  ear wax removal    RECORDS REQUESTED FROM:         Self referred- No records to collect per Pt    Clinic name Comments Records Status Imaging Status

## 2022-10-03 ENCOUNTER — HEALTH MAINTENANCE LETTER (OUTPATIENT)
Age: 37
End: 2022-10-03

## 2022-10-11 ASSESSMENT — ENCOUNTER SYMPTOMS
HEMATOCHEZIA: 0
PARESTHESIAS: 0
HEMATURIA: 0
DIZZINESS: 0
PALPITATIONS: 0
NAUSEA: 0
CHILLS: 0
HEADACHES: 0
WEAKNESS: 0
FEVER: 0
DYSURIA: 0
JOINT SWELLING: 0
COUGH: 0
NERVOUS/ANXIOUS: 0
FREQUENCY: 0
DIARRHEA: 0
HEARTBURN: 1
CONSTIPATION: 0
ABDOMINAL PAIN: 0
ARTHRALGIAS: 0
MYALGIAS: 0
SORE THROAT: 0
SHORTNESS OF BREATH: 0
EYE PAIN: 0

## 2022-10-12 ENCOUNTER — OFFICE VISIT (OUTPATIENT)
Dept: FAMILY MEDICINE | Facility: CLINIC | Age: 37
End: 2022-10-12
Payer: COMMERCIAL

## 2022-10-12 VITALS
HEART RATE: 91 BPM | BODY MASS INDEX: 37.19 KG/M2 | WEIGHT: 315 LBS | RESPIRATION RATE: 16 BRPM | HEIGHT: 77 IN | TEMPERATURE: 97.7 F | DIASTOLIC BLOOD PRESSURE: 88 MMHG | SYSTOLIC BLOOD PRESSURE: 138 MMHG | OXYGEN SATURATION: 98 %

## 2022-10-12 DIAGNOSIS — I10 ESSENTIAL HYPERTENSION: ICD-10-CM

## 2022-10-12 DIAGNOSIS — K64.4 EXTERNAL HEMORRHOIDS: ICD-10-CM

## 2022-10-12 DIAGNOSIS — Z82.49 FAMILY HISTORY OF CEREBRAL ANEURYSM: Primary | ICD-10-CM

## 2022-10-12 DIAGNOSIS — E66.01 MORBID OBESITY (H): ICD-10-CM

## 2022-10-12 PROCEDURE — 99214 OFFICE O/P EST MOD 30 MIN: CPT | Performed by: STUDENT IN AN ORGANIZED HEALTH CARE EDUCATION/TRAINING PROGRAM

## 2022-10-12 RX ORDER — POLYETHYLENE GLYCOL 3350 17 G/17G
1 POWDER, FOR SOLUTION ORAL DAILY
Qty: 850 G | Refills: 0 | Status: SHIPPED | OUTPATIENT
Start: 2022-10-12 | End: 2023-05-26

## 2022-10-12 RX ORDER — HYDROCHLOROTHIAZIDE 25 MG/1
25 TABLET ORAL DAILY
Qty: 90 TABLET | Refills: 1 | Status: SHIPPED | OUTPATIENT
Start: 2022-10-12 | End: 2023-04-03

## 2022-10-12 RX ORDER — HYDROCORTISONE 25 MG/G
CREAM TOPICAL 2 TIMES DAILY PRN
Qty: 30 G | Refills: 0 | Status: SHIPPED | OUTPATIENT
Start: 2022-10-12 | End: 2023-05-26

## 2022-10-12 RX ORDER — BENAZEPRIL HYDROCHLORIDE 20 MG/1
20 TABLET ORAL DAILY
Qty: 90 TABLET | Refills: 1 | Status: SHIPPED | OUTPATIENT
Start: 2022-10-12 | End: 2023-03-08

## 2022-10-12 ASSESSMENT — PAIN SCALES - GENERAL: PAINLEVEL: SEVERE PAIN (7)

## 2022-10-12 NOTE — PROGRESS NOTES
Assessment and Plan     36-year-old male with past with history of obesity, hypertension who presents with several concerns addressed as below today.    1. Family history of cerebral aneurysm  Mother recently  from cerebral aneurysm.  Patient would like to be screened.  Asymptomatic.  Discussed may not be covered by insurance but he would like to try which is reasonable.  - MRA Brain ([place]) w Contrast; Future    2. Morbid obesity (H)    3. Essential hypertension  Refilled he will follow-up with his PCP.  - hydrochlorothiazide (HYDRODIURIL) 25 MG tablet; Take 1 tablet (25 mg) by mouth daily  Dispense: 90 tablet; Refill: 1  - benazepril (LOTENSIN) 20 MG tablet; Take 1 tablet (20 mg) by mouth daily  Dispense: 90 tablet; Refill: 1    4. External hemorrhoids  Patient having intermittent bright red blood per rectum and can feel external hemorrhoids.  I recommended he trial conservative treatment with hydrocortisone now and MiraLAX daily to prevent them from occurring.  If these do recur I recommended he see a colorectal doctor and have a formal exam and consider further procedural treatment options.  - polyethylene glycol (MIRALAX) 17 GM/Dose powder; Take 17 g (1 capful) by mouth daily  Dispense: 850 g; Refill: 0  - hydrocortisone, Perianal, (HYDROCORTISONE) 2.5 % cream; Place rectally 2 times daily as needed for hemorrhoids  Dispense: 30 g; Refill: 0    Follow up: PRN  Options for treatment and follow-up care were reviewed with the patient and/or guardian. Hammad Estrada and/or guardian engaged in the decision making process and verbalized understanding of the options discussed and agreed with the final plan.    Dr. Adi Douglas         HPI:   Hammad Estrada is a 36 year old  male who presents for:    Chief Complaint   Patient presents with     Physical     Blood pressure meds refilled and hemroids. Ears flushed. Pt is fasting. Test to see about brain aneurysm  or any any aneurysm.      Hemorrhoids:     He tells me he has a flare about every 3 months with hemorrhoids.  He can feel external hemorrhoids.  They are painful and he will have blood on the stool in the toilet paper.  He has never had exam.  He is interested in basic treatment options as he has not done any.    BP med refiils  Patient is on 2 blood pressure medications needs refills on these today.  He has a primary care provider that he will follow-up regarding this.    Cerebral aneurysm:  Patient tells me his mother just passed away from a cerebral aneurysm that burst.  He would like to get screened for this.  He has not had any concerning symptoms like vision changes or headaches    Answers for HPI/ROS submitted by the patient on 10/11/2022  Frequency of exercise:: 2-3 days/week  Getting at least 3 servings of Calcium per day:: Yes  Diet:: Low salt, Low fat/cholesterol  Taking medications regularly:: Yes  Medication side effects:: None  Bi-annual eye exam:: NO  Dental care twice a year:: Yes  Sleep apnea or symptoms of sleep apnea:: Sleep apnea  abdominal pain: No  Blood in stool: No  Blood in urine: No  chest pain: No  chills: No  congestion: No  constipation: No  cough: No  diarrhea: No  dizziness: No  ear pain: No  eye pain: No  nervous/anxious: No  fever: No  frequency: No  genital sores: No  headaches: No  hearing loss: No  heartburn: Yes  arthralgias: No  joint swelling: No  peripheral edema: No  mood changes: No  myalgias: No  nausea: No  dysuria: No  palpitations: No  Skin sensation changes: No  sore throat: No  urgency: No  rash: No  shortness of breath: No  visual disturbance: No  weakness: No  impotence: No  penile discharge: No  Additional concerns today:: Yes  Duration of exercise:: 30-45 minutes         PMHX:     Patient Active Problem List   Diagnosis     Essential hypertension     Encounter for routine adult health examination without abnormal findings     Umbilical hernia without obstruction and without gangrene     Metabolic syndrome  X       Current Outpatient Medications   Medication Sig Dispense Refill     Collagen-Vitamin C-Biotin (COLLAGEN 1500/C PO) Take by mouth daily       fish oil-omega-3 fatty acids 1000 MG capsule Take 2 g by mouth daily       hydrochlorothiazide (HYDRODIURIL) 25 MG tablet Take 1 tablet (25 mg) by mouth daily 90 tablet 1     Minoxidil (ROGAINE MENS EX) hims Finasteride0.3%/Minoxidil6%.  Apply to areas of hair loss once daily. Do not exceed 4 sprays across the scalp.       MULTIPLE VITAMIN PO Take by mouth daily       TURMERIC PO Take by mouth daily       Vitamin D, Cholecalciferol, 25 MCG (1000 UT) CAPS Take 2,000 Units by mouth daily       benazepril (LOTENSIN) 20 MG tablet Take 1 tablet (20 mg) by mouth daily (Patient not taking: Reported on 10/12/2022) 90 tablet 1       Social History     Tobacco Use     Smoking status: Former     Packs/day: 1.00     Types: Cigarettes     Start date: 2004     Quit date: 2021     Years since quittin.7     Smokeless tobacco: Never     Tobacco comments:     Vape pen tobacco free   Vaping Use     Vaping Use: Every day     Substances: oil   Substance Use Topics     Alcohol use: Yes     Alcohol/week: 3.0 standard drinks     Types: 3 Glasses of wine per week     Comment: social.  Weekly.      Drug use: Yes     Types: Marijuana     Comment: very rare       Social History     Social History Narrative     Not on file       Allergies   Allergen Reactions     Penicillins Other (See Comments) and Unknown     Not sure- childhood allergy         No results found for this or any previous visit (from the past 24 hour(s)).         Review of Systems:    ROS: 10 point ROS neg other than the symptoms noted above in the HPI.         Physical Exam:     Vitals:    10/12/22 1123   BP: 138/88   BP Location: Left arm   Patient Position: Sitting   Cuff Size: Adult Large   Pulse: 91   Resp: 16   Temp: 97.7  F (36.5  C)   TempSrc: Temporal   SpO2: 98%   Weight: (!) 151.4 kg (333 lb 11.2 oz)   Height:  "1.943 m (6' 4.5\")     Body mass index is 40.09 kg/m .    General appearance: Alert, cooperative, no distress, appears stated age  Head: Normocephalic, atraumatic, without obvious abnormality  Eyes: Pupils equal round, reactive.  Conjunctiva clear.  Nose: Nares normal, no drainage.  Throat: Lips, mucosa, tongue normal mucosa pink and moist  Neck: Supple, symmetric, trachea midline,          "

## 2022-10-17 ENCOUNTER — OFFICE VISIT (OUTPATIENT)
Dept: FAMILY MEDICINE | Facility: CLINIC | Age: 37
End: 2022-10-17
Payer: COMMERCIAL

## 2022-10-17 VITALS
SYSTOLIC BLOOD PRESSURE: 112 MMHG | RESPIRATION RATE: 12 BRPM | TEMPERATURE: 99.2 F | BODY MASS INDEX: 37.19 KG/M2 | DIASTOLIC BLOOD PRESSURE: 76 MMHG | WEIGHT: 315 LBS | HEIGHT: 77 IN | HEART RATE: 80 BPM

## 2022-10-17 DIAGNOSIS — L05.91 PILONIDAL CYST: ICD-10-CM

## 2022-10-17 PROCEDURE — 99213 OFFICE O/P EST LOW 20 MIN: CPT | Performed by: FAMILY MEDICINE

## 2022-10-17 RX ORDER — METRONIDAZOLE 500 MG/1
500 TABLET ORAL 2 TIMES DAILY
Qty: 14 TABLET | Refills: 0 | Status: SHIPPED | OUTPATIENT
Start: 2022-10-17 | End: 2022-10-24

## 2022-10-17 RX ORDER — TRAMADOL HYDROCHLORIDE 50 MG/1
50-100 TABLET ORAL EVERY 6 HOURS PRN
Qty: 12 TABLET | Refills: 0 | Status: SHIPPED | OUTPATIENT
Start: 2022-10-17 | End: 2022-10-20

## 2022-10-17 NOTE — PROGRESS NOTES
"      Problem List Items Addressed This Visit        Medium    Pilonidal cyst     Cyst versus cellulitis, and does extend towards and up to pectinate line.  Will send to general surgery for further evaluation.  Did respond well to Flagyl previously, with no response to Bactrim previously and an allergy to penicillins.  Thus will put on treatment with Flagyl.  Side effects precautions discussed.         Relevant Medications    metroNIDAZOLE (FLAGYL) 500 MG tablet    traMADol (ULTRAM) 50 MG tablet    Other Relevant Orders    Adult General Surg Referral          Subjective   Hammad is a 36 year old who presents for the following health issues   Chief Complaint   Patient presents with     Derm Problem     \"Boils\" on Buttocks x10 days (pt states prior Hx of this issue approx. 3 years ago)      Patient developing pain in the buttock region in the gluteal fold.  Patient has had this previously in 2019.  But that one was more anterior in the perineum.  Did not respond to Septra and he was subsequently changed over to Flagyl and it responded.  Then also there was an incision made.  He said it took approximately 2 to 3 months before it stopped bleeding.  Base had no recurrence there.  This was started about a week ago.  Now painful to sit on.  Painful for bowel movement.  Has been taking Dulcolax to help keep the stool soft and that has been working.  No blood in his stools.  No fevers or chills.    History of Present Illness       Reason for visit:  Boil/abscess on my butt  Symptom onset:  1-2 weeks ago  Symptoms include:  Boils on buttocks  Symptom intensity:  Moderate  Symptom progression:  Worsening  Had these symptoms before:  Yes  Has tried/received treatment for these symptoms:  Yes  Previous treatment was successful:  Yes  Prior treatment description:  Excission/Medication  What makes it worse:  N/A  What makes it better:  N/A    He eats 2-3 servings of fruits and vegetables daily.He consumes 0 sweetened beverage(s) " "daily.He exercises with enough effort to increase his heart rate 30 to 60 minutes per day.  He exercises with enough effort to increase his heart rate 4 days per week.   He is taking medications regularly.       Review of Systems   All other systems reviewed and are negative.        Objective    /76 (BP Location: Left arm, Patient Position: Sitting, Cuff Size: Adult Large)   Pulse 80   Temp 99.2  F (37.3  C) (Oral)   Resp 12   Ht 1.943 m (6' 4.5\")   Wt (!) 152.2 kg (335 lb 9.6 oz)   BMI 40.32 kg/m    Body mass index is 40.32 kg/m .  Physical Exam  Vitals and nursing note reviewed.   Constitutional:       General: He is not in acute distress.     Appearance: Normal appearance. He is not ill-appearing.   HENT:      Head: Normocephalic and atraumatic.   Eyes:      Extraocular Movements: Extraocular movements intact.      Conjunctiva/sclera: Conjunctivae normal.   Pulmonary:      Effort: Pulmonary effort is normal.   Skin:     Capillary Refill: Capillary refill takes less than 2 seconds.      Comments: 3 cm area of induration and erythema of the right medial gluteal fold that does extend to the anus.  No drainage or identifiable nodule.  Tender to palpation.  No drainage.   Neurological:      Mental Status: He is alert and oriented to person, place, and time.   Psychiatric:         Attention and Perception: Attention normal.         Mood and Affect: Mood normal.         Speech: Speech normal.         Thought Content: Thought content normal.              This note has been dictated using voice recognition software. Any grammatical or context distortions are unintentional and inherent to the software      "

## 2022-10-17 NOTE — ASSESSMENT & PLAN NOTE
Cyst versus cellulitis, and does extend towards and up to pectinate line.  Will send to general surgery for further evaluation.  Did respond well to Flagyl previously, with no response to Bactrim previously and an allergy to penicillins.  Thus will put on treatment with Flagyl.  Side effects precautions discussed.

## 2022-10-25 ENCOUNTER — PRE VISIT (OUTPATIENT)
Dept: OTOLARYNGOLOGY | Facility: CLINIC | Age: 37
End: 2022-10-25

## 2023-01-28 DIAGNOSIS — I10 ESSENTIAL HYPERTENSION: ICD-10-CM

## 2023-01-29 RX ORDER — BENAZEPRIL HYDROCHLORIDE 20 MG/1
TABLET ORAL
Qty: 30 TABLET | Refills: 2 | OUTPATIENT
Start: 2023-01-29

## 2023-01-30 NOTE — TELEPHONE ENCOUNTER
Filled 10/12/22 90/1   [FreeTextEntry1] : Rosmery is a 6 yo M who presents with Mother for initial evaluation in our office regarding right supracondylar humerus fracture, sustained 10/19/22. Patient was playing with his brother when he fell onto his right elbow. He had pain and swelling about right elbow, exacerbated by movement. This occurred when family was in [place], and they flew home, and were seen in the ED the next day. XRs showed DUC fracture. He was placed into a long arm cast. Since injury, pain has improved. No tylenol/motrin needed. No numbness/tingling. No recent illnesses/fevers.\par He has been tolerating cast well.

## 2023-03-06 DIAGNOSIS — I10 ESSENTIAL HYPERTENSION: ICD-10-CM

## 2023-03-08 RX ORDER — BENAZEPRIL HYDROCHLORIDE 20 MG/1
TABLET ORAL
Qty: 30 TABLET | Refills: 2 | Status: SHIPPED | OUTPATIENT
Start: 2023-03-08 | End: 2023-05-26

## 2023-03-08 NOTE — TELEPHONE ENCOUNTER
"Routing refill request to provider for review/approval because:  Labs not current:  CR K    Last Written Prescription Date:  10/12/2022  Last Fill Quantity: 90,  # refills: 1   Last office visit provider:  10/17/2022     Requested Prescriptions   Pending Prescriptions Disp Refills     benazepril (LOTENSIN) 20 MG tablet [Pharmacy Med Name: BENAZEPRIL HCL 20 MG TABLET] 30 tablet 2     Sig: TAKE 1 TABLET BY MOUTH EVERY DAY       ACE Inhibitors (Including Combos) Protocol Failed - 3/8/2023 10:56 AM        Failed - Normal serum creatinine on file in past 12 months     Recent Labs   Lab Test 03/04/22  0831   CR 0.87       Ok to refill medication if creatinine is low          Failed - Normal serum potassium on file in past 12 months     Recent Labs   Lab Test 03/04/22  0831   POTASSIUM 4.0             Passed - Blood pressure under 140/90 in past 12 months     BP Readings from Last 3 Encounters:   10/17/22 112/76   10/12/22 138/88   03/04/22 122/67                 Passed - Recent (12 mo) or future (30 days) visit within the authorizing provider's specialty     Patient has had an office visit with the authorizing provider or a provider within the authorizing providers department within the previous 12 mos or has a future within next 30 days. See \"Patient Info\" tab in inbasket, or \"Choose Columns\" in Meds & Orders section of the refill encounter.              Passed - Medication is active on med list        Passed - Patient is age 18 or older             Viola Diaz RN 03/08/23 10:57 AM  "

## 2023-05-08 DIAGNOSIS — I10 ESSENTIAL HYPERTENSION: ICD-10-CM

## 2023-05-10 RX ORDER — HYDROCHLOROTHIAZIDE 25 MG/1
TABLET ORAL
Qty: 30 TABLET | Refills: 0 | Status: SHIPPED | OUTPATIENT
Start: 2023-05-10 | End: 2023-05-29

## 2023-05-10 NOTE — TELEPHONE ENCOUNTER
"Routing refill request to provider for review/approval because:  Reema given x1 and patient did not follow up, please advise  Labs not current:  multiple    Last Written Prescription Date:  4/3/2023  Last Fill Quantity: 30,  # refills: 0   Last office visit provider:  10/17/2022     Requested Prescriptions   Pending Prescriptions Disp Refills     hydrochlorothiazide (HYDRODIURIL) 25 MG tablet [Pharmacy Med Name: HYDROCHLOROTHIAZIDE 25 MG TAB] 30 tablet 0     Sig: TAKE 1 TABLET BY MOUTH EVERY DAY       Diuretics (Including Combos) Protocol Failed - 5/8/2023  1:32 PM        Failed - Normal serum creatinine on file in past 12 months     Recent Labs   Lab Test 03/04/22  0831   CR 0.87              Failed - Normal serum potassium on file in past 12 months     Recent Labs   Lab Test 03/04/22  0831   POTASSIUM 4.0                    Failed - Normal serum sodium on file in past 12 months     Recent Labs   Lab Test 03/04/22  0831                 Passed - Blood pressure under 140/90 in past 12 months     BP Readings from Last 3 Encounters:   10/17/22 112/76   10/12/22 138/88   03/04/22 122/67                 Passed - Recent (12 mo) or future (30 days) visit within the authorizing provider's specialty     Patient has had an office visit with the authorizing provider or a provider within the authorizing providers department within the previous 12 mos or has a future within next 30 days. See \"Patient Info\" tab in inbasket, or \"Choose Columns\" in Meds & Orders section of the refill encounter.              Passed - Medication is active on med list        Passed - Patient is age 18 or older             Phyllis Norris RN 05/09/23 9:23 PM      "

## 2023-05-21 ENCOUNTER — HEALTH MAINTENANCE LETTER (OUTPATIENT)
Age: 38
End: 2023-05-21

## 2023-05-26 ENCOUNTER — OFFICE VISIT (OUTPATIENT)
Dept: FAMILY MEDICINE | Facility: CLINIC | Age: 38
End: 2023-05-26
Payer: COMMERCIAL

## 2023-05-26 VITALS
HEART RATE: 83 BPM | WEIGHT: 315 LBS | OXYGEN SATURATION: 98 % | RESPIRATION RATE: 19 BRPM | SYSTOLIC BLOOD PRESSURE: 148 MMHG | TEMPERATURE: 98.4 F | DIASTOLIC BLOOD PRESSURE: 88 MMHG | BODY MASS INDEX: 37.19 KG/M2 | HEIGHT: 77 IN

## 2023-05-26 DIAGNOSIS — Z82.49 FAMILY HISTORY OF CEREBRAL ANEURYSM: ICD-10-CM

## 2023-05-26 DIAGNOSIS — Z00.00 ROUTINE GENERAL MEDICAL EXAMINATION AT A HEALTH CARE FACILITY: ICD-10-CM

## 2023-05-26 DIAGNOSIS — E66.01 MORBID OBESITY WITH BMI OF 40.0-44.9, ADULT (H): ICD-10-CM

## 2023-05-26 DIAGNOSIS — R53.83 OTHER FATIGUE: ICD-10-CM

## 2023-05-26 DIAGNOSIS — E87.1 HYPONATREMIA: ICD-10-CM

## 2023-05-26 DIAGNOSIS — K42.9 UMBILICAL HERNIA WITHOUT OBSTRUCTION AND WITHOUT GANGRENE: ICD-10-CM

## 2023-05-26 DIAGNOSIS — G47.33 OSA (OBSTRUCTIVE SLEEP APNEA): ICD-10-CM

## 2023-05-26 DIAGNOSIS — I10 ESSENTIAL HYPERTENSION: ICD-10-CM

## 2023-05-26 PROBLEM — L05.91 PILONIDAL CYST: Status: RESOLVED | Noted: 2022-10-17 | Resolved: 2023-05-26

## 2023-05-26 PROBLEM — K61.1 PERIRECTAL ABSCESS: Status: ACTIVE | Noted: 2022-10-18

## 2023-05-26 LAB
ALBUMIN UR-MCNC: NEGATIVE MG/DL
ANION GAP SERPL CALCULATED.3IONS-SCNC: 12 MMOL/L (ref 7–15)
APPEARANCE UR: CLEAR
BILIRUB UR QL STRIP: NEGATIVE
BUN SERPL-MCNC: 12.5 MG/DL (ref 6–20)
CALCIUM SERPL-MCNC: 9.4 MG/DL (ref 8.6–10)
CHLORIDE SERPL-SCNC: 103 MMOL/L (ref 98–107)
CHOLEST SERPL-MCNC: 216 MG/DL
COLOR UR AUTO: YELLOW
CREAT SERPL-MCNC: 0.83 MG/DL (ref 0.67–1.17)
DEPRECATED HCO3 PLAS-SCNC: 21 MMOL/L (ref 22–29)
GFR SERPL CREATININE-BSD FRML MDRD: >90 ML/MIN/1.73M2
GLUCOSE SERPL-MCNC: 103 MG/DL (ref 70–99)
GLUCOSE UR STRIP-MCNC: NEGATIVE MG/DL
HBA1C MFR BLD: 5.2 % (ref 0–5.6)
HDLC SERPL-MCNC: 45 MG/DL
HGB UR QL STRIP: NEGATIVE
KETONES UR STRIP-MCNC: NEGATIVE MG/DL
LDLC SERPL CALC-MCNC: 131 MG/DL
LEUKOCYTE ESTERASE UR QL STRIP: NEGATIVE
NITRATE UR QL: NEGATIVE
NONHDLC SERPL-MCNC: 171 MG/DL
PH UR STRIP: 6.5 [PH] (ref 5–7)
POTASSIUM SERPL-SCNC: 4.3 MMOL/L (ref 3.4–5.3)
SODIUM SERPL-SCNC: 136 MMOL/L (ref 136–145)
SP GR UR STRIP: 1.01 (ref 1–1.03)
TRIGL SERPL-MCNC: 202 MG/DL
TSH SERPL DL<=0.005 MIU/L-ACNC: 0.7 UIU/ML (ref 0.3–4.2)
UROBILINOGEN UR STRIP-ACNC: 0.2 E.U./DL

## 2023-05-26 PROCEDURE — 87491 CHLMYD TRACH DNA AMP PROBE: CPT | Performed by: INTERNAL MEDICINE

## 2023-05-26 PROCEDURE — 99215 OFFICE O/P EST HI 40 MIN: CPT | Mod: 25 | Performed by: INTERNAL MEDICINE

## 2023-05-26 PROCEDURE — 86780 TREPONEMA PALLIDUM: CPT | Performed by: INTERNAL MEDICINE

## 2023-05-26 PROCEDURE — 83036 HEMOGLOBIN GLYCOSYLATED A1C: CPT | Performed by: INTERNAL MEDICINE

## 2023-05-26 PROCEDURE — 87591 N.GONORRHOEAE DNA AMP PROB: CPT | Performed by: INTERNAL MEDICINE

## 2023-05-26 PROCEDURE — 99395 PREV VISIT EST AGE 18-39: CPT | Performed by: INTERNAL MEDICINE

## 2023-05-26 PROCEDURE — 87389 HIV-1 AG W/HIV-1&-2 AB AG IA: CPT | Performed by: INTERNAL MEDICINE

## 2023-05-26 PROCEDURE — 80048 BASIC METABOLIC PNL TOTAL CA: CPT | Performed by: INTERNAL MEDICINE

## 2023-05-26 PROCEDURE — 84443 ASSAY THYROID STIM HORMONE: CPT | Performed by: INTERNAL MEDICINE

## 2023-05-26 PROCEDURE — 36415 COLL VENOUS BLD VENIPUNCTURE: CPT | Performed by: INTERNAL MEDICINE

## 2023-05-26 PROCEDURE — 80061 LIPID PANEL: CPT | Performed by: INTERNAL MEDICINE

## 2023-05-26 PROCEDURE — 81003 URINALYSIS AUTO W/O SCOPE: CPT | Performed by: INTERNAL MEDICINE

## 2023-05-26 RX ORDER — LOSARTAN POTASSIUM 50 MG/1
50 TABLET ORAL DAILY
Qty: 30 TABLET | Refills: 1 | Status: SHIPPED | OUTPATIENT
Start: 2023-05-26 | End: 2023-07-24

## 2023-05-26 ASSESSMENT — ENCOUNTER SYMPTOMS
PALPITATIONS: 0
HEADACHES: 0
NERVOUS/ANXIOUS: 0
PARESTHESIAS: 0
HEMATURIA: 0
JOINT SWELLING: 0
CONSTIPATION: 0
SHORTNESS OF BREATH: 0
DYSURIA: 0
HEARTBURN: 1
MYALGIAS: 0
FEVER: 0
DIARRHEA: 0
SORE THROAT: 0
COUGH: 0
CHILLS: 0
FREQUENCY: 0
HEMATOCHEZIA: 0
EYE PAIN: 0
WEAKNESS: 0
ABDOMINAL PAIN: 0
ARTHRALGIAS: 0
DIZZINESS: 0
NAUSEA: 0

## 2023-05-26 ASSESSMENT — PAIN SCALES - GENERAL: PAINLEVEL: NO PAIN (0)

## 2023-05-26 NOTE — Clinical Note
Jerry Best- odd question, but this patient was wondering if there is a way to establish with a therapist who is montes?  He doesn't have gender concerns per se but feels like a shared perspective would be therapeutic? Do you have a recommendation?-Lindsay

## 2023-05-26 NOTE — PROGRESS NOTES
SUBJECTIVE:   CC: Hammad is an 37 year old who presents for preventative health visit.       5/26/2023    10:57 AM   Additional Questions   Roomed by Merced BARRIOS   Patient has been advised of split billing requirements and indicates understanding: Yes  Healthy Habits:     Getting at least 3 servings of Calcium per day:  Yes    Bi-annual eye exam:  Yes    Dental care twice a year:  Yes    Sleep apnea or symptoms of sleep apnea:  Sleep apnea    Diet:  Low salt and Low fat/cholesterol    Frequency of exercise:  4-5 days/week    Duration of exercise:  15-30 minutes    Taking medications regularly:  Yes    PHQ-2 Total Score: 2    Additional concerns today:  Yes    Would like to talk about weight loss, high cholesterol, high blood pressure.    Specifically Wegovy.  Curious about what options there are.   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  Enjoys food, tries to eat healthy (4-5 fruits/vegetables per day).  Has struggled with weight for a long time.     Has been on benazepril and hydrochlorothiazide since 2021.  Had leg swelling with amlodipine.  Never misses doses.  Doesn't check blood pressures at home.  Ideally, doesn't want to add another medication to his regimen, but would be open with replacing benazepril with losartan.  Doesn't want to try a beta blocker at this point because worries about effect on lowering mood and sexual dysfunction.  Not currently sexually active, but would like to be at some point.    Couldn't use CPAP - didn't tolerate it- even nasal pillows, full face mask, prongs.  Tried wearing it while awake to get accustomed to it.  Went back to the sleep center multiple times and nothing worked.  Sleeps on his side.    He is tired throughout the day.  His mood is up and down but has been lower- would be interested in therapy.  Doesn't want to try any medications that can affect his mood.      Today's PHQ-2 Score:        2023     9:41 AM   PHQ-2 (  [employer])   Q1: Little interest or pleasure in doing things 1   Q2: Feeling down, depressed or hopeless 1   PHQ-2 Score 2   Q1: Little interest or pleasure in doing things Several days   Q2: Feeling down, depressed or hopeless Several days   PHQ-2 Score 2            Social History     Tobacco Use     Smoking status: Former     Packs/day: 1.00     Years: 17.00     Pack years: 17.00     Types: Cigarettes     Start date: 2004     Quit date: 2021     Years since quittin.3     Smokeless tobacco: Never     Tobacco comments:     Vape pen tobacco free- vapes vitamins.   Vaping Use     Vaping status: Every Day     Substances: Flavoring, oil     Devices: Refillable tank     Start date: 2021   Substance Use Topics     Alcohol use: Yes     Alcohol/week: 3.0 standard drinks of alcohol     Types: 3 Glasses of wine per week     Comment: social.  Weekly.              2023     9:41 AM   Alcohol Use   Prescreen: >3 drinks/day or >7 drinks/week? No          View : No data to display.                Last PSA: No results found for: PSA    Reviewed orders with patient. Reviewed health maintenance and updated orders accordingly - Yes  Lab work is in process    Reviewed and updated as needed this visit by clinical staff   Tobacco  Allergies  Meds   Med Hx   Fam Hx          Reviewed and updated as needed this visit by Provider   Tobacco   Meds   Med Hx   Fam Hx             Review of Systems   Constitutional: Negative for chills and fever.   HENT: Negative for congestion, ear pain, hearing loss and sore throat.    Eyes: Negative for pain and visual disturbance.   Respiratory: Negative for cough and shortness of breath.    Cardiovascular: Positive for peripheral edema. Negative for chest pain and palpitations.   Gastrointestinal: Positive for heartburn. Negative for abdominal pain, constipation, diarrhea, hematochezia and nausea.   Genitourinary: Positive for urgency. Negative  "for dysuria, frequency, genital sores, hematuria, impotence and penile discharge.   Musculoskeletal: Negative for arthralgias, joint swelling and myalgias.   Skin: Negative for rash.   Neurological: Negative for dizziness, weakness, headaches and paresthesias.   Psychiatric/Behavioral: Negative for mood changes. The patient is not nervous/anxious.          OBJECTIVE:   BP (!) 148/88   Pulse 83   Temp 98.4  F (36.9  C) (Temporal)   Resp 19   Ht 1.956 m (6' 5\")   Wt (!) 156 kg (344 lb)   SpO2 98%   BMI 40.79 kg/m      Physical Exam  GENERAL: healthy, alert and no distress  EYES: Eyes grossly normal to inspection, PERRL and conjunctivae and sclerae normal  HENT: ear canals and TM's normal, nose and mouth without ulcers or lesions  NECK: no adenopathy, no asymmetry, masses, or scars and thyroid normal to palpation  RESP: lungs clear to auscultation - no rales, rhonchi or wheezes  CV: regular rate and rhythm, normal S1 S2, no S3 or S4, no murmur, click or rub, no peripheral edema and peripheral pulses strong  ABDOMEN: soft, nontender, umbilical hernia easily reducible.  MS: no gross musculoskeletal defects noted, no edema  SKIN: no suspicious lesions or rashes  NEURO: Normal strength and tone, mentation intact and speech normal  PSYCH: mentation appears normal, affect normal/bright    Diagnostic Test Results:  Labs reviewed in Epic    ASSESSMENT/PLAN:   Hammad was seen today for physical.    Diagnoses and all orders for this visit:    Routine general medical examination at a health care facility  -     Hemoglobin A1c; Future  -     Lipid panel reflex to direct LDL Non-fasting; Future  -     Treponema Abs w Reflex to RPR and Titer; Future  -     HIV Antigen Antibody Combo; Future  -     Chlamydia trachomatis PCR; Future  -     Neisseria gonorrhoeae PCR; Future  -     Hemoglobin A1c  -     Lipid panel reflex to direct LDL Non-fasting  -     Treponema Abs w Reflex to RPR and Titer  -     HIV Antigen Antibody " Combo  -     Chlamydia trachomatis PCR  -     Neisseria gonorrhoeae PCR    Immunizations: Declines COVID vaccine today.    Discussed healthy lifestyle and aging recommendations including regular exercise, adequate and regular sleep, 5+ fruits and veggies daily.    Essential hypertension  Comments:  Goal <130/80; currently uncontrolled.  Doesn't want to add another med and had leg swelling with amlodipine.  Replace benazepril with losartan 5/26/23.  Orders:  -     Basic metabolic panel  (Ca, Cl, CO2, Creat, Gluc, K, Na, BUN); Future  -     Home Blood Pressure Monitor Order for DME - ONLY FOR DME  -     losartan (COZAAR) 50 MG tablet; Take 1 tablet (50 mg) by mouth daily  - Stop benazepril (my thought of replacing benazepril with losartan is that losartan has more options with combo pills if third agent is needed)  - Hammad will send me a list of his blood pressures in 1 week (BP cuff prescribed).  If above 130/80 goal, then will double losartan dose to 100 mg daily  - If blood pressures remain above 130/80 goal after 1 week on higher dose losartan, then would recommend starting diltiazem 120 mg daily (we discussed beta blocker side effects and Hammad does not want to trial beta blockers due to sexual dysfunction, fatigue, and potentially mood depression; doesn't want to restart amlodipine because he had significant pedal edema on that medication)  -     UA Macroscopic with reflex to Microscopic and Culture; Future  -     Basic metabolic panel  (Ca, Cl, CO2, Creat, Gluc, K, Na, BUN)    Morbid obesity with BMI of 40.0-44.9, adult (H)  Comments:  Prescribed Wegovy 5/26.  Reviewed side effects, diet, exercise.  Referred to wt mgmt clinic as well.  Orders:  -     Hemoglobin A1c; Future  -     Lipid panel reflex to direct LDL Non-fasting; Future  -     Adult Comprehensive Weight Management  Referral; Future  -     Semaglutide-Weight Management (WEGOVY) 0.25 MG/0.5ML pen; Inject 0.25 mg Subcutaneous once a  "week  - Virtual/in person visit in 1 month to discuss side effects and possible dose increase; I counseled Hammad that prior authorization process may be drawn out so may take some time to get this medication  -     Hemoglobin A1c  -     Lipid panel reflex to direct LDL Non-fasting     Hyponatremia  Comments:  Noted on last BMP.  Recheck today because can be related to hctz.    VIRGILIO (obstructive sleep apnea)  Comments:  Prescribed CPAP but multiple masks, attachments were intolerable.  Currently untreated.  Monitor for now, weight loss plan as above.    Other fatigue- May be due to untreated sleep apnea, low mood.  Check labs today and continue to monitor while pursuing weight loss.  -     TSH with free T4 reflex; Future  -     Adult Mental Health  Referral; Future- he would like to establish with therapy, feeling low (not to the level of depression) and thinks there may be some situational mood effects  -     TSH with free T4 reflex    Family history of cerebral aneurysm  Comments:  Mom  due to ruptured aneurysm.  She is the only known relative with aneurysm.  Do not recommend screening at this point.    Umbilical hernia without obstruction and without gangrene  Comments:  Easily reducible, not causing any symptoms.  No further mgmt needed at this time.    Other orders  -     REVIEW OF HEALTH MAINTENANCE PROTOCOL ORDERS        Patient has been advised of split billing requirements and indicates understanding: Yes      COUNSELING:   Reviewed preventive health counseling, as reflected in patient instructions      BMI:   Estimated body mass index is 40.79 kg/m  as calculated from the following:    Height as of this encounter: 1.956 m (6' 5\").    Weight as of this encounter: 156 kg (344 lb).   Weight management plan: Patient referred to endocrine and/or weight management specialty Discussed healthy diet and exercise guidelines      He reports that he quit smoking about 2 years ago. His smoking use included " cigarettes. He started smoking about 19 years ago. He has a 17.00 pack-year smoking history. He has never used smokeless tobacco.    In addition to the preventive visit, 45  minutes of the appointment were spent evaluating and developing a treatment plan for his additional concern(s).        Lauren Wang MD  [facility]

## 2023-05-26 NOTE — PATIENT INSTRUCTIONS
- Check your blood pressure every day for 7 days (after starting the losartan), send me a [employer] message with the results    - Stop benazepril and replace with losartan    - Continue hydrochlorothiazide     - Weight loss- I sent a referral to the specialty clinic, in the meantime:    - Start Wegovy   (may take a while to get insurance approval)    - I will send you a message with your lab results    - I will follow up with Nasir Garcia and send you a message to let you know if you should schedule with him    Check your blood pressure once daily at different times of the day and write down your results. Send your results via [employer] or bring to your next visit. Goal blood pressure is less than 140/90. If your blood pressure is higher than this, we will need to discuss a change in treatment.       Home Blood Pressure Monitoring:    To prepare to take your blood pressure:  Do not consume any caffeinated beverages (tea, coffee, soda), do not smoke, do not exercise for 30 minutes before measuring your blood pressure.  Sit quietly for at least 5 minutes before starting to measure your blood pressure.     To measure your blood pressure:  Sit with both feet flat on the floor. Do not stand, do not lie down.  Place the cuff on your arm above your elbow so that your elbow can bend comfortably.  Pull the cuff tight enough to stay in place and allow for your fingers to fit between your arm and the cuff.  Position the cuff so that the cord lies down the inside of your arm.  Do not talk while you are using the machine.  Press the START button. It will squeeze your arm and then release slowly.   When you see the numbers on the screen, you are done.   Write the numbers down and the time of day so that you can track what they are.      Hypertension is the major risk factor for chronic kidney disease and stroke.  A systolic blood pressure (the upper number) of 150 is associated with an 8-fold increased risk of stroke compared to a  systolic blood pressure of 110.      Hypertension can be treated with diet, exercise, and medication.  Some patients need medication to lower their blood pressure.    The DASH diet can help lower blood pressure.  It is a plant-based diet that focuses on fruits, vegetables, whole grains, low-fat dairy products, and very little meat.  It includes one serving of nuts, seeds, or legumes per day.  Sodiums is limited to 2400 mg per day.          Diet Change    To help reduce blood pressure, it is recommended that individuals reduce their sodium content to 2,300 mg or 1,500 mg. Below are alternatives to high-sodium and high-fat foods.  Reducing Salt Content    Foods high in salt (sodium) Low-salt alternatives   smoked, cured, salted, and canned meat, fish, poultry unsalted fresh or frozen beef, lamb, pork, fish, poultry   regular hard and processed cheese  regular peanut butter low-sodium cheese  low-sodium peanut butter   crackers with salted tops unsalted crackers   regular canned and dehydrated soups  low-sodium soups, broths, bouillons   regular canned vegetables fresh and frozen vegetables    salted snack foods unsalted snack foods       Reducing Fat Content    Food category Foods high in fat Lower fat alternatives   Dairy  whole milk  ice cream    cheese  skim, 1%, 2% milk  sorbet, sherbert, frozen yogurt  low- or reduced-fat cheese   Pasta ramen noodles  pasta with cream sauce  granola rice  pasta with tomato sauce  reduced fat granola   Meat, fish, poultry ground beef  chicken or turkey with skin  hot dogs  adams sausage   oil packed tuna   whole eggs low-fat, extra-lean meats,  skinless chicken or turkey    low-fat hot dog    turkey adams  water-packed tuna  egg whites, egg substitute   Baked goods croissants   donuts  muffins,   party crackers  cake, cookies hard rolls, English muffins  bagels  reduced fat muffins  low-fat crackers  keiry food cake   Snacks and sweets nuts  ice cream popcorn, fruits,  vegetables  frozen yogurt, pudding bars   Fats, oils, and salad dressings butter, margarine  mayonnaise  salad dressings  oils, shortening, lard light margarine  light mayonnaise, mustard  fat free salad dressing  nonstick cooking spray     What are the changes that you plan to make in your diet?    Reduce salt by:_________________________________________________________    Reduce saturated fat by:__________________________________________    Reducing salt content data from  Alternatives to High-Sodium Foods,  by the U.S. Food and Drug Administration, n.d. Retrieved January 9, 2007, from http://www.fda.gov/fdac/foodlabel/sodtabl.html. Reducing fat content data from  The Practical Guide: Identification, Evaluation and Treatment of Overweight and Obesity in Adults  (NIH Publication No. ), by the National Institutes of Health, 2000. Retrieved January 9, 2007, from http://www.nhlbi.nih.gov/ guidelines/obesity/prctgd_c.pdf.        Preventive Health Recommendations  Male Ages 26 - 39    Yearly exam:             See your health care provider every year in order to  o   Review health changes.   o   Discuss preventive care.    o   Review your medicines if your doctor has prescribed any.  You should be tested each year for STDs (sexually transmitted diseases), if you re at risk.   After age 35, talk to your provider about cholesterol testing. If you are at risk for heart disease, have your cholesterol tested at least every 5 years.   If you are at risk for diabetes, you should have a diabetes test (fasting glucose).  Shots: Get a flu shot each year. Get a tetanus shot every 10 years.     Nutrition:  Eat at least 5 servings of fruits and vegetables daily.   Eat whole-grain bread, whole-wheat pasta and brown rice instead of white grains and rice.   Get adequate Calcium and Vitamin D.     Lifestyle  Exercise for at least 150 minutes a week (30 minutes a day, 5 days a week). This will help you control your weight and  prevent disease.   Limit alcohol to one drink per day.   No smoking.   Wear sunscreen to prevent skin cancer.   See your dentist every six months for an exam and cleaning.

## 2023-05-27 LAB
C TRACH DNA SPEC QL NAA+PROBE: NEGATIVE
HIV 1+2 AB+HIV1 P24 AG SERPL QL IA: NONREACTIVE
N GONORRHOEA DNA SPEC QL NAA+PROBE: NEGATIVE
T PALLIDUM AB SER QL: NONREACTIVE

## 2023-05-30 ENCOUNTER — MYC MEDICAL ADVICE (OUTPATIENT)
Dept: FAMILY MEDICINE | Facility: CLINIC | Age: 38
End: 2023-05-30
Payer: COMMERCIAL

## 2023-05-30 ENCOUNTER — TELEPHONE (OUTPATIENT)
Dept: FAMILY MEDICINE | Facility: CLINIC | Age: 38
End: 2023-05-30
Payer: COMMERCIAL

## 2023-05-30 DIAGNOSIS — E66.01 MORBID OBESITY WITH BMI OF 40.0-44.9, ADULT (H): Primary | ICD-10-CM

## 2023-05-30 NOTE — TELEPHONE ENCOUNTER
This Rx for Semaglutide-Weight Management (WEGOVY) 0.25 MG/0.5ML pencannot be ordered.  On backorder.  Please put in a new Rx for an alternative.

## 2023-05-31 RX ORDER — TOPIRAMATE 25 MG/1
25 TABLET, FILM COATED ORAL DAILY
Qty: 30 TABLET | Refills: 1 | Status: SHIPPED | OUTPATIENT
Start: 2023-05-31 | End: 2023-07-24

## 2023-05-31 NOTE — TELEPHONE ENCOUNTER
Hello Team,    Could you please follow up with Hammad on the Wegovy?  It's backordered so is not an option right now.  I can prescribe bupropion/naltrexone or topiramate but per our discussion in person, I don't think he'd want anything that can affect mood.  Alternatively, we could wait and see if/when Wegovy will be available again.    Lindsay

## 2023-05-31 NOTE — TELEPHONE ENCOUNTER
Dr. Wang --    Spoke with patient who agreed to try the topiramate for weight loss.     Pharmacy: [facility].     Thank you,   Romelia Alvarez, BSN RN  [facility]

## 2023-05-31 NOTE — TELEPHONE ENCOUNTER
Noted.    No further action needed from triage.    EMILEE AlvesN, RN-BC  [facility]

## 2023-05-31 NOTE — TELEPHONE ENCOUNTER
Left message to call back and ask to speak with an available triage nurse.    EMILEE AlvesN, RN-BC  [facility]

## 2023-06-01 NOTE — TELEPHONE ENCOUNTER
Dr. Wang: do you wish to prescribe an alternative?    Hi Dr. Wang!     So I just talked to my pharmacy, and Wegovy won t be available until September. Please advise on the next steps.     AD [facility]

## 2023-06-02 NOTE — TELEPHONE ENCOUNTER
I believe I already addressed this yesterday in another chain- could you confirm?  I sent topiramate to the pharmacy as an alternative.    Thank you!     Lauren Wang, DO  Internal Medicine - Pediatrics Physician  [facility]

## 2023-06-05 DIAGNOSIS — I10 ESSENTIAL HYPERTENSION: ICD-10-CM

## 2023-06-05 RX ORDER — BENAZEPRIL HYDROCHLORIDE 20 MG/1
TABLET ORAL
Qty: 90 TABLET | OUTPATIENT
Start: 2023-06-05

## 2023-06-05 NOTE — TELEPHONE ENCOUNTER
"Routing refill request to provider for review/approval because:  Drug not active on patient's medication list    Last Written Prescription Date:    Last Fill Quantity: ,  # refills:    Last office visit provider:  5/26/23, PCP     Requested Prescriptions   Pending Prescriptions Disp Refills     benazepril (LOTENSIN) 20 MG tablet [Pharmacy Med Name: BENAZEPRIL HCL 20 MG TABLET] 90 tablet      Sig: TAKE 1 TABLET BY MOUTH EVERY DAY       ACE Inhibitors (Including Combos) Protocol Failed - 6/5/2023  7:57 AM        Failed - Blood pressure under 140/90 in past 12 months     BP Readings from Last 3 Encounters:   05/26/23 (!) 148/88   10/17/22 112/76   10/12/22 138/88                 Failed - Medication is active on med list        Passed - Recent (12 mo) or future (30 days) visit within the authorizing provider's specialty     Patient has had an office visit with the authorizing provider or a provider within the authorizing providers department within the previous 12 mos or has a future within next 30 days. See \"Patient Info\" tab in inbasket, or \"Choose Columns\" in Meds & Orders section of the refill encounter.              Passed - Patient is age 18 or older        Passed - Normal serum creatinine on file in past 12 months     Recent Labs   Lab Test 05/26/23  1227   CR 0.83       Ok to refill medication if creatinine is low          Passed - Normal serum potassium on file in past 12 months     Recent Labs   Lab Test 05/26/23  1227   POTASSIUM 4.3                  Bhumi Olivares RN 06/05/23 3:40 PM  "

## 2023-06-28 ENCOUNTER — MYC MEDICAL ADVICE (OUTPATIENT)
Dept: FAMILY MEDICINE | Facility: CLINIC | Age: 38
End: 2023-06-28
Payer: COMMERCIAL

## 2023-06-28 NOTE — TELEPHONE ENCOUNTER
Writer responded via "[employer]    Romelia Alvarez, EMILEEN RN  [facility]

## 2023-07-21 DIAGNOSIS — I10 ESSENTIAL HYPERTENSION: ICD-10-CM

## 2023-07-21 DIAGNOSIS — E66.01 MORBID OBESITY WITH BMI OF 40.0-44.9, ADULT (H): ICD-10-CM

## 2023-07-22 NOTE — TELEPHONE ENCOUNTER
"Routing refill request to provider for review/approval because:  bp out of range    Last Written Prescription Date:  5/26/23  Last Fill Quantity: 30,  # refills: 1   Last office visit provider:  5/26/23     Requested Prescriptions   Pending Prescriptions Disp Refills     losartan (COZAAR) 50 MG tablet 30 tablet 1     Sig: Take 1 tablet (50 mg) by mouth daily       Angiotensin-II Receptors Failed - 7/21/2023  2:15 PM        Failed - Last blood pressure under 140/90 in past 12 months     BP Readings from Last 3 Encounters:   05/26/23 (!) 148/88   10/17/22 112/76   10/12/22 138/88                 Passed - Recent (12 mo) or future (30 days) visit within the authorizing provider's specialty     Patient has had an office visit with the authorizing provider or a provider within the authorizing providers department within the previous 12 mos or has a future within next 30 days. See \"Patient Info\" tab in inbasket, or \"Choose Columns\" in Meds & Orders section of the refill encounter.              Passed - Medication is active on med list        Passed - Patient is age 18 or older        Passed - Normal serum creatinine on file in past 12 months     Recent Labs   Lab Test 05/26/23  1227   CR 0.83       Ok to refill medication if creatinine is low          Passed - Normal serum potassium on file in past 12 months     Recent Labs   Lab Test 05/26/23  1227   POTASSIUM 4.3                         Romelia Evans, RN 07/22/23 5:48 PM  "

## 2023-07-24 RX ORDER — TOPIRAMATE 25 MG/1
25 TABLET, FILM COATED ORAL DAILY
Qty: 30 TABLET | Refills: 1 | Status: SHIPPED | OUTPATIENT
Start: 2023-07-24 | End: 2023-08-18

## 2023-07-24 RX ORDER — LOSARTAN POTASSIUM 50 MG/1
50 TABLET ORAL DAILY
Qty: 90 TABLET | Refills: 3 | Status: SHIPPED | OUTPATIENT
Start: 2023-07-24

## 2023-07-24 NOTE — TELEPHONE ENCOUNTER
Signed prescription- could you please ask him to schedule follow up appointment with me to discuss how things are going on new medications?    Lauren Wang, DO  Internal Medicine - Pediatrics Physician  [facility]

## 2023-08-18 ENCOUNTER — TELEPHONE (OUTPATIENT)
Dept: FAMILY MEDICINE | Facility: CLINIC | Age: 38
End: 2023-08-18
Payer: COMMERCIAL

## 2023-08-18 DIAGNOSIS — E66.01 MORBID OBESITY WITH BMI OF 40.0-44.9, ADULT (H): ICD-10-CM

## 2023-08-18 RX ORDER — TOPIRAMATE 25 MG/1
25 TABLET, FILM COATED ORAL DAILY
Qty: 30 TABLET | Refills: 1 | Status: SHIPPED | OUTPATIENT
Start: 2023-08-18 | End: 2023-12-27

## 2023-08-18 NOTE — TELEPHONE ENCOUNTER
Writer called and left message on patient's voicemail to call back and speak with a triage nurse.    Dr. Wang --  Could you please ask patient to come back in to see me in the next 2 months to how he's doing on the new medicine and get labs done?         Romelia Alvarez, BSN RN  [facility]

## 2023-08-18 NOTE — TELEPHONE ENCOUNTER
Could you please ask patient to come back in to see me in the next 2 months to how he's doing on the new medicine and get labs done?    Thank you!    Lindsay Wang, DO  Internal Medicine - Pediatrics Physician  [facility]

## 2023-08-18 NOTE — TELEPHONE ENCOUNTER
Routing refill request to provider for review/approval because:   Review Authorizing provider's last note.   Labs due: CBC, platelet count    Last Written Prescription Date:  7/24/23  Last Fill Quantity: 30,  # refills: 1   Last office visit: 5/26/2023 with Kathleen  Future Office Visit:      Tiffany HYMAN RN  [facility]

## 2023-12-27 ENCOUNTER — VIRTUAL VISIT (OUTPATIENT)
Dept: FAMILY MEDICINE | Facility: CLINIC | Age: 38
End: 2023-12-27
Payer: COMMERCIAL

## 2023-12-27 ENCOUNTER — TELEPHONE (OUTPATIENT)
Dept: FAMILY MEDICINE | Facility: CLINIC | Age: 38
End: 2023-12-27

## 2023-12-27 DIAGNOSIS — G47.33 OSA (OBSTRUCTIVE SLEEP APNEA): ICD-10-CM

## 2023-12-27 DIAGNOSIS — I10 ESSENTIAL HYPERTENSION: ICD-10-CM

## 2023-12-27 DIAGNOSIS — E66.813 CLASS 3 SEVERE OBESITY WITH SERIOUS COMORBIDITY AND BODY MASS INDEX (BMI) OF 40.0 TO 44.9 IN ADULT, UNSPECIFIED OBESITY TYPE (H): Primary | ICD-10-CM

## 2023-12-27 DIAGNOSIS — E78.2 MIXED HYPERLIPIDEMIA: ICD-10-CM

## 2023-12-27 DIAGNOSIS — E66.01 CLASS 3 SEVERE OBESITY WITH SERIOUS COMORBIDITY AND BODY MASS INDEX (BMI) OF 40.0 TO 44.9 IN ADULT, UNSPECIFIED OBESITY TYPE (H): Primary | ICD-10-CM

## 2023-12-27 PROBLEM — E87.1 HYPONATREMIA: Status: RESOLVED | Noted: 2023-05-26 | Resolved: 2023-12-27

## 2023-12-27 PROCEDURE — 99215 OFFICE O/P EST HI 40 MIN: CPT | Mod: VID | Performed by: INTERNAL MEDICINE

## 2023-12-27 RX ORDER — NALTREXONE HYDROCHLORIDE 50 MG/1
50 TABLET, FILM COATED ORAL DAILY
Qty: 90 TABLET | Refills: 3 | Status: SHIPPED | OUTPATIENT
Start: 2023-12-27

## 2023-12-27 NOTE — PROGRESS NOTES
"Hammad is a 37 year old who is being evaluated via a billable video visit.      How would you like to obtain your AVS? MyChart  If the video visit is dropped, the invitation should be resent by: Text to cell phone: [phone]  Will anyone else be joining your video visit? No      Assessment & Plan     (E66.01,  Z68.41) Class 3 severe obesity with serious comorbidity and body mass index (BMI) of 40.0 to 44.9 in adult, unspecified obesity type (H)  (primary encounter diagnosis)  Comment: Biggest challenge is overeating at times- uses cannabis and then overeats.  Would like to try GLP 1 agonist but agreeable to trialing naltrexone as well to see if it will help reduce cravings/overeating.  Didn't like topiramate- changed his mood so stopped it several months ago.  Never started Wegovy due to shortage.  Discussed exercise, eating plan at last visit as components of healthy weight management plan.  Plan: naltrexone (DEPADE/REVIA) 50 MG tablet,         tirzepatide-Weight Management (ZEPBOUND) 2.5         MG/0.5ML prefilled pen        If Zepbound not covered/available, will try Wegovy.  If Wegovy not covered/available, will try bupropion.  - Gave list of recommended primary care providers to establish with going forward.    (I10) Essential hypertension  (G47.33) VIRGILIO (obstructive sleep apnea)  (E78.2) Mixed hyperlipidemia  Comment: As above  Plan:   - Continue losartan 50 mg daily, hydrochlorothiazide 25 mg daily unchanged  - Weight loss as above        I spent a total of 41 minutes on the day of the visit.   Time spent by me doing chart review, history and exam, documentation and further activities per the note       BMI:   Estimated body mass index is 40.79 kg/m  as calculated from the following:    Height as of 5/26/23: 1.956 m (6' 5\").    Weight as of 5/26/23: 156 kg (344 lb).   Weight management plan: Discussed healthy diet and exercise guidelines and meds as above.    Patient Instructions   - Start Zepbound- " biggest side effects are stomach fullness, discomfort, and nausea- we will reduce the risk of these side effects by cutting portions in half at each meal (and then eat a little more if still hungry) and by slowly increasing the dose.  The other big side effect is constipation, keep an eye on this and try Miralax or senna if needed to have a soft, easy bowel movement each day.    - If Zepbound is not covered or available, then I will switch you to Wegovy.  If Wegovy isn't covered or available, then I will switch to bupropion.    - Start naltrexone 50 mg daily- increase to 100 mg daily after 3 days if you're not noticing side effects.  Biggest side effects are sleepiness initially and sometimes GI side effects (abdominal discomfort, diarrhea- typically gets better).  I recommend getting liver tests checked at your next appointment to make sure this is still a safe medicine for you.    - In the future, you can talk about starting Adderall for ADHD if your ADHD is not well controlled.  Bupropion can also sometimes help with ADHD.    Lauren Wang, [facility] is a 37 year old, presenting for the following health issues:  Weight Loss and Hypertension      12/27/2023     8:52 AM   Additional Questions   Roomed by AD PARK       History of Present Illness       Hypertension: He presents for follow up of hypertension.  He does check blood pressure  regularly outside of the clinic. Outpatient blood pressures have not been over 140/90. He follows a low salt diet.     Reason for visit:  I need help with weight loss. PLEASE.    He eats 2-3 servings of fruits and vegetables daily.He consumes 0 sweetened beverage(s) daily.He exercises with enough effort to increase his heart rate 30 to 60 minutes per day.  He exercises with enough effort to increase his heart rate 3 or less days per week.   He is taking medications regularly.    Blood pressure have been great at home.   Not higher than 140/90.    Weight     Topiramate was causing mood changes and changed his demeanor.  Stopped because he didn't like the way he felt- only took it for a month.     Got a new job- unfortunately had to switch insurance and needs to find new primary care provider in the [employer] system.    Uses edibles every day.  Gets the munchies and then eats more.    Has ADHD and wonders if Adderall would be helpful.  Was diagnosed with ADHD in [place].  Took Ritalin for years and eventually stopped it because he hated it- doesn't remember exactly what he didn't like about it.  Would be interested in some ADHD med down the road although would be open to starting with bupropion for added goal of weight loss initially.  Doesn't tend to struggle with anxiety.      Review of Systems         Objective           Vitals:  No vitals were obtained today due to virtual visit.    Physical Exam   GENERAL: Healthy, alert and no distress  EYES: Eyes grossly normal to inspection.  No discharge or erythema, or obvious scleral/conjunctival abnormalities.  RESP: No audible wheeze, cough, or visible cyanosis.  No visible retractions or increased work of breathing.    SKIN: Visible skin clear. No significant rash, abnormal pigmentation or lesions.  NEURO: Cranial nerves grossly intact.  Mentation and speech appropriate for age.  PSYCH: Mentation appears normal, affect normal/bright, judgement and insight intact, normal speech and appearance well-groomed.            Video-Visit Details    Type of service:  Video Visit     Originating Location (pt. Location): Home    Distant Location (provider location):  On-site  Platform used for Video Visit: [employer]

## 2023-12-27 NOTE — PATIENT INSTRUCTIONS
- Start Zepbound- biggest side effects are stomach fullness, discomfort, and nausea- we will reduce the risk of these side effects by cutting portions in half at each meal (and then eat a little more if still hungry) and by slowly increasing the dose.  The other big side effect is constipation, keep an eye on this and try Miralax or senna if needed to have a soft, easy bowel movement each day.    - If Zepbound is not covered or available, then I will switch you to Wegovy.  If Wegovy isn't covered or available, then I will switch to bupropion.    - Start naltrexone 50 mg daily- increase to 100 mg daily after 3 days if you're not noticing side effects.  Biggest side effects are sleepiness initially and sometimes GI side effects (abdominal discomfort, diarrhea- typically gets better).  I recommend getting liver tests checked at your next appointment to make sure this is still a safe medicine for you.    - In the future, you can talk about starting Adderall for ADHD if your ADHD is not well controlled.  Bupropion can also sometimes help with ADHD.

## 2023-12-27 NOTE — TELEPHONE ENCOUNTER
Plan does not cover tirzepatide-Weight Management (ZEPBOUND) 2.5 MG/0.5ML prefilled pen.  Please start a new PA.    Reason for denial: PA required    Insurance plan:    Patient ID:

## 2023-12-29 NOTE — TELEPHONE ENCOUNTER
PA Initiation    Medication: TIRZEPATIDE-WEIGHT MANAGEMENT 2.5 MG/0.5ML SC SOAJ  Insurance Company: [employer] - Phone [phone] Fax [phone]  Ph[facility]illing [id number] [place] [facility] [place] [place] - [address] [place] [address]  Filling Pharmacy Phone: [phone]  Filling Pharmacy Fax:    Start Date: 12/29/2023

## 2024-01-03 NOTE — TELEPHONE ENCOUNTER
I just sent a new prescription for Wegovy- could you please let patient know?  And if it's denied then would consider trialing bupropion in addition to naltrexone.    Lauren Wang, DO  Internal Medicine - Pediatrics Physician  [facility]

## 2024-01-03 NOTE — TELEPHONE ENCOUNTER
PRIOR AUTHORIZATION DENIED    Medication: TIRZEPATIDE-WEIGHT MANAGEMENT 2.5 MG/0.5ML SC SOAJ  Insurance Company: [employer] - Phone [phone] Fax [phone]  Denial Date: 1/3/2024  Denial Reason(s):     Appeal Information:     Patient Notified: No

## 2024-01-04 RX ORDER — BUPROPION HYDROCHLORIDE 150 MG/1
150 TABLET ORAL EVERY MORNING
Qty: 90 TABLET | Refills: 3 | Status: SHIPPED | OUTPATIENT
Start: 2024-01-04

## 2024-01-04 NOTE — TELEPHONE ENCOUNTER
Dr. Wang --    Please review and advise.     Patient stated that his insurance does cover Wegovy, but it is in short supply.     Patient would like to start on the bupropion in addition to naltrexone.    Writer responded via [employer].    Dr. Wang message:   I just sent a new prescription for Wegovy- could you please let patient know?  And if it's denied then would consider trialing bupropion in addition to naltrexone.       Romelia Alvarez, EMILEEN RN  [facility]

## 2024-01-05 NOTE — TELEPHONE ENCOUNTER
Perfect- I sent the bupropion to the pharmacy.  We talked about side effects during our visit.     Lauren Wang DO  Internal Medicine - Pediatrics Physician  [facility]

## 2024-01-05 NOTE — TELEPHONE ENCOUNTER
Writer called patient to inform of both scripts being sent into the pharmacy.   Patient stated understanding.     EMILEE GutierrezN AD  [facility]

## 2024-02-20 ENCOUNTER — TELEPHONE (OUTPATIENT)
Dept: FAMILY MEDICINE | Facility: CLINIC | Age: 39
End: 2024-02-20

## 2024-02-20 NOTE — TELEPHONE ENCOUNTER
Prior Authorization Retail Medication Request     Medication/Dose: Wegovy 0.25MG/0.5ML PEN  Diagnosis and ICD code (if different than what is on RX):  Class 3 severe obesity with serious comorbidity and body mass index (BMI) of 40.0 to 44.9 in adult, unspecified obesity type (H) [E66.01, Z68.41]  - Primary      Insurance   Primary: N/A  Insurance ID:  N/A     Pharmacy Information (if different than what is on RX)  [facility] [place]  Phone:  [phone]  Fax: [phone]

## 2024-03-03 NOTE — TELEPHONE ENCOUNTER
PA needed 2nd request  Call [facility] [phone] ID [id number]

## 2024-03-05 NOTE — TELEPHONE ENCOUNTER
PA Initiation    Medication: WEGOVY 0.25 MG/0.5ML SC SOAJ  Insurance Company: [employer] - Phone [phone] Fax [phone]  [id number] [place] [facility] [place] [place] - [address] [place] [address]  Filling Pharmacy Phone: [phone]  Filling Pharmacy Fax: [phone]  Start Date: 3/5/2024

## 2024-03-05 NOTE — TELEPHONE ENCOUNTER
PRIOR AUTHORIZATION DENIED    Medication: WEGOVY 0.25 MG/0.5ML SC SOAJ    Insurance Company: [employer] - Phone [phone] Fax [phone]    Denial Date: 3/5/2024    Denial Reason(s): Excluded    Appeal Information:

## 2024-03-06 NOTE — TELEPHONE ENCOUNTER
Left message to call back and ask to speak with an available triage nurse.  Tiffany HYMAN RN  [facility]

## 2024-03-06 NOTE — TELEPHONE ENCOUNTER
Patient was planning to establish care elsewhere since he had an insurance change- could you please check in with him to see if he would like me to prescribe bupropion instead since Wegovy wasn't covered?    Lauren Wang, DO  Internal Medicine - Pediatrics Physician  [facility]

## 2024-03-08 NOTE — TELEPHONE ENCOUNTER
Writer called and left message on patient's voicemail to call back and speak with a triage nurse.    EMILEE GutierrezN RN  [facility]

## 2024-06-13 ENCOUNTER — MYC REFILL (OUTPATIENT)
Dept: FAMILY MEDICINE | Facility: CLINIC | Age: 39
End: 2024-06-13

## 2024-06-13 DIAGNOSIS — E66.01 CLASS 3 SEVERE OBESITY WITH SERIOUS COMORBIDITY AND BODY MASS INDEX (BMI) OF 40.0 TO 44.9 IN ADULT, UNSPECIFIED OBESITY TYPE (H): ICD-10-CM

## 2024-06-13 DIAGNOSIS — E66.813 CLASS 3 SEVERE OBESITY WITH SERIOUS COMORBIDITY AND BODY MASS INDEX (BMI) OF 40.0 TO 44.9 IN ADULT, UNSPECIFIED OBESITY TYPE (H): ICD-10-CM

## 2024-06-14 NOTE — TELEPHONE ENCOUNTER
Refilled 1 time, please call patient to schedule for appointment for additional refills to see if we should increase or change the dose.  I will provide refills in case can't get in in next few months.

## 2024-07-28 ENCOUNTER — HEALTH MAINTENANCE LETTER (OUTPATIENT)
Age: 39
End: 2024-07-28